# Patient Record
Sex: FEMALE | Race: WHITE | Employment: OTHER | URBAN - METROPOLITAN AREA
[De-identification: names, ages, dates, MRNs, and addresses within clinical notes are randomized per-mention and may not be internally consistent; named-entity substitution may affect disease eponyms.]

---

## 2017-03-20 ENCOUNTER — MYC MEDICAL ADVICE (OUTPATIENT)
Dept: FAMILY MEDICINE | Facility: CLINIC | Age: 62
End: 2017-03-20

## 2017-03-28 ENCOUNTER — OFFICE VISIT (OUTPATIENT)
Dept: FAMILY MEDICINE | Facility: CLINIC | Age: 62
End: 2017-03-28
Payer: COMMERCIAL

## 2017-03-28 VITALS
DIASTOLIC BLOOD PRESSURE: 88 MMHG | HEART RATE: 70 BPM | HEIGHT: 67 IN | SYSTOLIC BLOOD PRESSURE: 139 MMHG | OXYGEN SATURATION: 96 % | WEIGHT: 204 LBS | TEMPERATURE: 97.8 F | BODY MASS INDEX: 32.02 KG/M2

## 2017-03-28 DIAGNOSIS — F33.0 MAJOR DEPRESSIVE DISORDER, RECURRENT EPISODE, MILD (H): ICD-10-CM

## 2017-03-28 DIAGNOSIS — R06.02 SOB (SHORTNESS OF BREATH): ICD-10-CM

## 2017-03-28 DIAGNOSIS — E03.9 HYPOTHYROIDISM, UNSPECIFIED TYPE: ICD-10-CM

## 2017-03-28 DIAGNOSIS — E03.9 HYPOTHYROIDISM, UNSPECIFIED TYPE: Primary | ICD-10-CM

## 2017-03-28 DIAGNOSIS — R53.83 FATIGUE, UNSPECIFIED TYPE: ICD-10-CM

## 2017-03-28 DIAGNOSIS — E78.5 HYPERLIPIDEMIA LDL GOAL <130: ICD-10-CM

## 2017-03-28 LAB
ALBUMIN SERPL-MCNC: 3.8 G/DL (ref 3.4–5)
ALP SERPL-CCNC: 52 U/L (ref 40–150)
ALT SERPL W P-5'-P-CCNC: 88 U/L (ref 0–50)
ANION GAP SERPL CALCULATED.3IONS-SCNC: 10 MMOL/L (ref 3–14)
AST SERPL W P-5'-P-CCNC: 28 U/L (ref 0–45)
BASOPHILS # BLD AUTO: 0 10E9/L (ref 0–0.2)
BASOPHILS NFR BLD AUTO: 0.1 %
BILIRUB SERPL-MCNC: 0.4 MG/DL (ref 0.2–1.3)
BUN SERPL-MCNC: 14 MG/DL (ref 7–30)
CALCIUM SERPL-MCNC: 8.9 MG/DL (ref 8.5–10.1)
CHLORIDE SERPL-SCNC: 108 MMOL/L (ref 94–109)
CHOLEST SERPL-MCNC: 234 MG/DL
CO2 SERPL-SCNC: 25 MMOL/L (ref 20–32)
CREAT SERPL-MCNC: 0.81 MG/DL (ref 0.52–1.04)
DIFFERENTIAL METHOD BLD: NORMAL
EOSINOPHIL # BLD AUTO: 0.1 10E9/L (ref 0–0.7)
EOSINOPHIL NFR BLD AUTO: 1.7 %
ERYTHROCYTE [DISTWIDTH] IN BLOOD BY AUTOMATED COUNT: 12.6 % (ref 10–15)
GFR SERPL CREATININE-BSD FRML MDRD: 72 ML/MIN/1.7M2
GLUCOSE SERPL-MCNC: 92 MG/DL (ref 70–99)
HCT VFR BLD AUTO: 46.2 % (ref 35–47)
HDLC SERPL-MCNC: 40 MG/DL
HGB BLD-MCNC: 15.5 G/DL (ref 11.7–15.7)
LDLC SERPL CALC-MCNC: 151 MG/DL
LYMPHOCYTES # BLD AUTO: 2.9 10E9/L (ref 0.8–5.3)
LYMPHOCYTES NFR BLD AUTO: 34.9 %
MCH RBC QN AUTO: 32.1 PG (ref 26.5–33)
MCHC RBC AUTO-ENTMCNC: 33.5 G/DL (ref 31.5–36.5)
MCV RBC AUTO: 96 FL (ref 78–100)
MONOCYTES # BLD AUTO: 0.7 10E9/L (ref 0–1.3)
MONOCYTES NFR BLD AUTO: 8.5 %
NEUTROPHILS # BLD AUTO: 4.6 10E9/L (ref 1.6–8.3)
NEUTROPHILS NFR BLD AUTO: 54.8 %
NONHDLC SERPL-MCNC: 194 MG/DL
NT-PROBNP SERPL-MCNC: 48 PG/ML (ref 0–125)
PLATELET # BLD AUTO: 240 10E9/L (ref 150–450)
POTASSIUM SERPL-SCNC: 4.1 MMOL/L (ref 3.4–5.3)
PROT SERPL-MCNC: 7.3 G/DL (ref 6.8–8.8)
RBC # BLD AUTO: 4.83 10E12/L (ref 3.8–5.2)
SODIUM SERPL-SCNC: 143 MMOL/L (ref 133–144)
TRIGL SERPL-MCNC: 214 MG/DL
TSH SERPL DL<=0.005 MIU/L-ACNC: 0.87 MU/L (ref 0.4–4)
WBC # BLD AUTO: 8.4 10E9/L (ref 4–11)

## 2017-03-28 PROCEDURE — 36415 COLL VENOUS BLD VENIPUNCTURE: CPT | Performed by: FAMILY MEDICINE

## 2017-03-28 PROCEDURE — 80061 LIPID PANEL: CPT | Performed by: FAMILY MEDICINE

## 2017-03-28 PROCEDURE — 85025 COMPLETE CBC W/AUTO DIFF WBC: CPT | Performed by: FAMILY MEDICINE

## 2017-03-28 PROCEDURE — 80053 COMPREHEN METABOLIC PANEL: CPT | Performed by: FAMILY MEDICINE

## 2017-03-28 PROCEDURE — 99214 OFFICE O/P EST MOD 30 MIN: CPT | Performed by: FAMILY MEDICINE

## 2017-03-28 PROCEDURE — 84443 ASSAY THYROID STIM HORMONE: CPT | Performed by: FAMILY MEDICINE

## 2017-03-28 PROCEDURE — 83880 ASSAY OF NATRIURETIC PEPTIDE: CPT | Performed by: FAMILY MEDICINE

## 2017-03-28 RX ORDER — LEVOTHYROXINE SODIUM 112 MCG
112 TABLET ORAL DAILY
Qty: 90 TABLET | Refills: 3 | Status: SHIPPED | OUTPATIENT
Start: 2017-03-28 | End: 2017-03-28

## 2017-03-28 RX ORDER — CITALOPRAM HYDROBROMIDE 40 MG/1
40 TABLET ORAL DAILY
Qty: 90 TABLET | Refills: 3 | Status: SHIPPED | OUTPATIENT
Start: 2017-03-28 | End: 2017-03-28

## 2017-03-28 RX ORDER — CITALOPRAM HYDROBROMIDE 40 MG/1
40 TABLET ORAL DAILY
Qty: 90 TABLET | Refills: 3 | Status: SHIPPED | OUTPATIENT
Start: 2017-03-28 | End: 2018-05-16

## 2017-03-28 RX ORDER — LEVOTHYROXINE SODIUM 112 MCG
112 TABLET ORAL DAILY
Qty: 90 TABLET | Refills: 3 | Status: SHIPPED | OUTPATIENT
Start: 2017-03-28 | End: 2018-04-25

## 2017-03-28 ASSESSMENT — PAIN SCALES - GENERAL: PAINLEVEL: NO PAIN (0)

## 2017-03-28 NOTE — PROGRESS NOTES
SUBJECTIVE:                                                    Prakash Golden is a 62 year old female who presents to clinic today for the following health issues:    She complains of shortness of breath with activity and fatigue. She has chronic bilateral leg pain and is limited in walking. She is not interested in using a walker crutches or a scooter because she wants to walk as much as possible to keep her strength. However she has not been doing very much at all the past few months.  She has no orthopnea, PND, ankle swelling. She had a negative stress test in the distant past.  Total face to face time: 25 minutes.   Time spent counseling on 15 minutes as outline above.      Pt has been having SOB, pt falls asleep easliy during the day,tired and out of breath, if bends over to tie shoes pt can't breath at all  x 2 months and is getting worse.        Problem list and histories reviewed & adjusted, as indicated.  Additional history: as documented        Reviewed and updated as needed this visit by clinical staff       Reviewed and updated as needed this visit by Provider    Medical, surgical, family, social histories, allergies and meds reviewed and updated.    ROS:  General: fatigue  Resp: dyspnea on exertion  CV: No chest pains or palpitations  GI: No nausea, vomiting,  heartburn, abdominal pain, diarrhea, constipation or change in bowel habits    Exam:  GENERAL APPEARANCE ADULT: Alert, no acute distress  NECK: No adenopathy,masses or thyromegaly  RESP: lungs clear to auscultation   CV: normal rate, regular rhythm, no murmur or gallop  ABDOMEN: soft, no organomegaly, masses or tenderness    ASSESSMENT:  (E03.9) Hypothyroidism, unspecified type  (primary encounter diagnosis)  Comment: Stable on medication  Plan: TSH with free T4 reflex, DISCONTINUED:         SYNTHROID 112 MCG tablet            (E78.5) Hyperlipidemia LDL goal <130  Comment:   Plan: Lipid Profile with reflex to direct LDL            (F33.0)  Major depressive disorder, recurrent episode, mild (H)  Comment: We will increase to 40 mg daily.  Plan: DISCONTINUED: citalopram (CELEXA) 40 MG tablet            (R53.83) Fatigue, unspecified type  Comment:   Plan: CBC with platelets differential, Comprehensive         metabolic panel            (R06.02) SOB (shortness of breath) due to minimal exercise.  Comment:   Plan: BNP-N terminal pro              PLAN:  Orders Placed This Encounter     TSH with free T4 reflex     Lipid Profile with reflex to direct LDL     CBC with platelets differential     Comprehensive metabolic panel     BNP-N terminal pro     DISCONTD: SYNTHROID 112 MCG tablet     DISCONTD: citalopram (CELEXA) 40 MG tablet   She will call [employer] and see if she can get a ride to the [facility] to use the pool and exercise equipment.  She will also call [employer] to see if she can get rides to visit her friends, relatives and so forth.  Recheck in clinic as needed.      There are no Patient Instructions on file for this visit.      Julio Pat

## 2017-03-28 NOTE — NURSING NOTE
"Chief Complaint   Patient presents with     sleep concern      pt falls easliy during the day,tired and out of breath, if bends over to tie shoes pt can't breath at all  x 2 months        Initial /88 (BP Location: Right arm, Patient Position: Chair, Cuff Size: Adult Large)  Pulse 70  Temp 97.8  F (36.6  C) (Tympanic)  Ht 5' 7\" (1.702 m)  Wt 204 lb (92.5 kg)  SpO2 96%  Breastfeeding? No  BMI 31.95 kg/m2 Estimated body mass index is 31.95 kg/(m^2) as calculated from the following:    Height as of this encounter: 5' 7\" (1.702 m).    Weight as of this encounter: 204 lb (92.5 kg).  Medication Reconciliation: complete   Swati FLOOD      "

## 2017-03-28 NOTE — MR AVS SNAPSHOT
After Visit Summary   3/28/2017    Prakash Golden    MRN: [id number]           Patient Information     Date Of Birth          1955        Visit Information        Provider Department      3/28/2017 9:20 AM Julio Pat MD [facility]        Today's Diagnoses     Hypothyroidism, unspecified type    -  1    Hyperlipidemia LDL goal <130        Major depressive disorder, recurrent episode, mild (H)        Fatigue, unspecified type        SOB (shortness of breath)           Follow-ups after your visit        Who to contact     If you have questions or need follow up information about today's clinic visit or your schedule please contact [facility] directly at [phone].  Normal or non-critical lab and imaging results will be communicated to you by MyChart, letter or phone within 4 business days after the clinic has received the results. If you do not hear from us within 7 days, please contact the clinic through [employer] or phone. If you have a critical or abnormal lab result, we will notify you by phone as soon as possible.  Submit refill requests through [employer] or call your pharmacy and they will forward the refill request to us. Please allow 3 business days for your refill to be completed.          Additional Information About Your Visit        MyChart Information     [employer] [facility] gives you secure access to your electronic health record. If you see a primary care provider, you can also send messages to your care team and make appointments. If you have questions, please call your primary care clinic.  If you do not have a primary care provider, please call [phone] and they will assist you.        Care EveryWhere ID     This is your Care EveryWhere ID. This could be used by other organizations to access your [facility] medical records  [id number]        Your Vitals Were     Pulse Temperature Height Pulse Oximetry Breastfeeding? BMI (Body Mass Index)  "   70 97.8  F (36.6  C) (Tympanic) 5' 7\" (1.702 m) 96% No 31.95 kg/m2       Blood Pressure from Last 3 Encounters:   03/28/17 139/88   08/17/16 124/85   02/04/16 127/74    Weight from Last 3 Encounters:   03/28/17 204 lb (92.5 kg)   08/17/16 190 lb (86.2 kg)   02/04/16 195 lb 3.2 oz (88.5 kg)              We Performed the Following     BNP-N terminal pro     CBC with platelets differential     Comprehensive metabolic panel     Lipid Profile with reflex to direct LDL     TSH with free T4 reflex          Today's Medication Changes          These changes are accurate as of: 3/28/17 11:42 AM.  If you have any questions, ask your nurse or doctor.               These medicines have changed or have updated prescriptions.        Dose/Directions    citalopram 40 MG tablet   Commonly known as:  celeXA   This may have changed:    - medication strength  - how much to take   Used for:  Major depressive disorder, recurrent episode, mild (H)   Changed by:  Julio Pat MD        Dose:  40 mg   Take 1 tablet (40 mg) by mouth daily   Quantity:  90 tablet   Refills:  3            Where to get your medicines      These medications were sent to [facility] [address] [place] [address] - [address]   [address] [facility] [address]     Phone:  [phone]     citalopram 40 MG tablet    SYNTHROID 112 MCG tablet                Primary Care Provider Office Phone # Fax #    Julio Pat [phone]       [facility] [address] [facility] [address]        Thank you!     Thank you for choosing [facility]  for your care. Our goal is always to provide you with excellent care. Hearing back from our patients is one way we can continue to improve our services. Please take a few minutes to complete the written survey that you may receive in the mail after your visit with us. Thank you!             Your Updated Medication List - Protect others around you: Learn " how to safely use, store and throw away your medicines at www.disposemymeds.org.          This list is accurate as of: 3/28/17 11:42 AM.  Always use your most recent med list.                   Brand Name Dispense Instructions for use    acetaminophen-codeine 300-30 MG per tablet    TYLENOL #3    120 tablet    Take 1-2 tablets by mouth every 4 hours as needed       citalopram 40 MG tablet    celeXA    90 tablet    Take 1 tablet (40 mg) by mouth daily       IBUPROFEN PO      Take 600 mg by mouth       meclizine 25 MG tablet    ANTIVERT    30 tablet    Take 1 tablet (25 mg) by mouth every 6 hours as needed for dizziness       OMEGA-3 FISH OIL PO      Take 2 capsules by mouth daily       ondansetron 4 MG ODT tab    ZOFRAN ODT    30 tablet    Take 1-2 tablets (4-8 mg) by mouth every 8 hours as needed for nausea       SYNTHROID 112 MCG tablet   Generic drug:  levothyroxine     90 tablet    Take 1 tablet (112 mcg) by mouth daily

## 2017-03-28 NOTE — LETTER
[facility]  [address]  [facility] [address]  Phone: [phone]      3/29/2017     Praaksh Golden  [address] [address]      Dear Prakash:    Thank you for allowing me to participate in your care. Your recent test results were reviewed and listed below.      Your results are provided below for your review  Results for orders placed or performed in visit on 03/28/17   TSH with free T4 reflex   Result Value Ref Range    TSH 0.87 0.40 - 4.00 mU/L   Lipid Profile with reflex to direct LDL   Result Value Ref Range    Cholesterol 234 (H) <200 mg/dL    Triglycerides 214 (H) <150 mg/dL    HDL Cholesterol 40 (L) >49 mg/dL    LDL Cholesterol Calculated 151 (H) <100 mg/dL    Non HDL Cholesterol 194 (H) <130 mg/dL   CBC with platelets differential   Result Value Ref Range    WBC 8.4 4.0 - 11.0 10e9/L    RBC Count 4.83 3.8 - 5.2 10e12/L    Hemoglobin 15.5 11.7 - 15.7 g/dL    Hematocrit 46.2 35.0 - 47.0 %    MCV 96 78 - 100 fl    MCH 32.1 26.5 - 33.0 pg    MCHC 33.5 31.5 - 36.5 g/dL    RDW 12.6 10.0 - 15.0 %    Platelet Count 240 150 - 450 10e9/L    Diff Method Automated Method     % Neutrophils 54.8 %    % Lymphocytes 34.9 %    % Monocytes 8.5 %    % Eosinophils 1.7 %    % Basophils 0.1 %    Absolute Neutrophil 4.6 1.6 - 8.3 10e9/L    Absolute Lymphocytes 2.9 0.8 - 5.3 10e9/L    Absolute Monocytes 0.7 0.0 - 1.3 10e9/L    Absolute Eosinophils 0.1 0.0 - 0.7 10e9/L    Absolute Basophils 0.0 0.0 - 0.2 10e9/L   Comprehensive metabolic panel   Result Value Ref Range    Sodium 143 133 - 144 mmol/L    Potassium 4.1 3.4 - 5.3 mmol/L    Chloride 108 94 - 109 mmol/L    Carbon Dioxide 25 20 - 32 mmol/L    Anion Gap 10 3 - 14 mmol/L    Glucose 92 70 - 99 mg/dL    Urea Nitrogen 14 7 - 30 mg/dL    Creatinine 0.81 0.52 - 1.04 mg/dL    GFR Estimate 72 >60 mL/min/1.7m2    GFR Estimate If Black 87 >60 mL/min/1.7m2    Calcium 8.9 8.5 - 10.1 mg/dL    Bilirubin Total 0.4 0.2 - 1.3 mg/dL     Albumin 3.8 3.4 - 5.0 g/dL    Protein Total 7.3 6.8 - 8.8 g/dL    Alkaline Phosphatase 52 40 - 150 U/L    ALT 88 (H) 0 - 50 U/L    AST 28 0 - 45 U/L   BNP-N terminal pro   Result Value Ref Range    N-Terminal Pro Bnp 48 0 - 125 pg/mL     Your risk of having a heart attack or chest pain or a stroke or mini stroke is about 12% in the next 10 years. This is considered moderate risk. It would be recommended that you start a cholesterol medication. The remainder of your labs are normal.  Please let us know if you would like to start a cholesterol medication.             Thank you for choosing [facility]. As a result, please continue with the treatment plan discussed in the office. Return as discussed or sooner if symptoms worsen or fail to improve. If you have any further questions or concerns, please do not hesitate to contact us.      Sincerely,        Dr. Julio Pat

## 2017-03-28 NOTE — TELEPHONE ENCOUNTER
Reason for Call:  Other prescription    Detailed comments: Pt saw Dr. DAVID Pat today and her 2 Rxs should have been sent to [facility] and not [facility].  Pt called [facility] and cancelled Rxs - Please send Rx to [facility].  No need to call patient back, unless there are questions or problems.      Phone Number Patient can be reached at: Home number on file [phone] (home)    Best Time: any    Can we leave a detailed message on this number? YES    Call taken on 3/28/2017 at 4:03 PM by Iris Tay

## 2017-05-20 ENCOUNTER — TELEPHONE (OUTPATIENT)
Dept: FAMILY MEDICINE | Facility: CLINIC | Age: 62
End: 2017-05-20

## 2017-05-20 NOTE — TELEPHONE ENCOUNTER
5/20/2017    Call Regarding Preventive Health Screening Mammogram and Cervical/PAP    Attempt 3    Message on voicemail     Comments: Clinic made prior attempts        Outreach   MESFIN

## 2017-06-10 ENCOUNTER — HOSPITAL ENCOUNTER (EMERGENCY)
Facility: CLINIC | Age: 62
Discharge: HOME | End: 2017-06-10
Attending: NURSE PRACTITIONER | Admitting: NURSE PRACTITIONER
Payer: COMMERCIAL

## 2017-06-10 VITALS
DIASTOLIC BLOOD PRESSURE: 95 MMHG | OXYGEN SATURATION: 97 % | BODY MASS INDEX: 31.95 KG/M2 | SYSTOLIC BLOOD PRESSURE: 162 MMHG | TEMPERATURE: 98.2 F | RESPIRATION RATE: 16 BRPM | HEART RATE: 88 BPM | WEIGHT: 204 LBS

## 2017-06-10 DIAGNOSIS — J02.0 ACUTE STREPTOCOCCAL PHARYNGITIS: Primary | ICD-10-CM

## 2017-06-10 PROCEDURE — 99213 OFFICE O/P EST LOW 20 MIN: CPT

## 2017-06-10 PROCEDURE — 99213 OFFICE O/P EST LOW 20 MIN: CPT | Performed by: NURSE PRACTITIONER

## 2017-06-10 RX ORDER — CEPHALEXIN 500 MG/1
500 CAPSULE ORAL 2 TIMES DAILY
Qty: 20 CAPSULE | Refills: 0 | Status: SHIPPED | OUTPATIENT
Start: 2017-06-10 | End: 2017-06-20

## 2017-06-10 NOTE — ED AVS SNAPSHOT
[facility] Emergency Department    [address] [facility] [address]    Phone:  [phone]    Fax:  [phone]                                       Prakash Golden   MRN: [id number]    Department:  [facility] Emergency Department   Date of Visit:  6/10/2017           After Visit Summary Signature Page     I have received my discharge instructions, and my questions have been answered. I have discussed any challenges I see with this plan with the nurse or doctor.    ..........................................................................................................................................  Patient/Patient Representative Signature      ..........................................................................................................................................  Patient Representative Print Name and Relationship to Patient    ..................................................               ................................................  Date                                            Time    ..........................................................................................................................................  Reviewed by Signature/Title    ...................................................              ..............................................  Date                                                            Time

## 2017-06-10 NOTE — DISCHARGE INSTRUCTIONS
Pharyngitis: Strep (Confirmed)     You have had a positive test for strep throat. Strep throat is a contagious illness. It is spread by coughing, kissing or by touching others after touching your mouth or nose. Symptoms include throat pain which is worse with swallowing, aching all over, headache and fever. It is treated with antibiotic medication. This should help you start to feel better within 1-2 days.  Home care    Rest at home. Drink plenty of fluids to avoid dehydration.    No work or school for the first 2 days of taking the antibiotics. After this time, you will not be contagious. You can then return to school or work if you are feeling better.     The antibiotic medication must be taken for the full 10 days, even if you feel better. This is very important to ensure the infection is treated. It is also important to prevent drug-resistent organisms from developing. If you were given an antibiotic shot, no more antibiotics are needed.    You may use acetaminophen (Tylenol) or ibuprofen (Motrin, Advil) to control pain or fever, unless another medicine was prescribed for this. (NOTE: If you have chronic liver or kidney disease or ever had a stomach ulcer or GI bleeding, talk with your doctor before using these medicines.)    Throat lozenges or sprays (such as Chloraseptic) help reduce pain. Gargling with warm salt water will also reduce throat pain. Dissolve 1/2 teaspoon of salt in 1 glass of warm water. This may be useful just before meals.     Soft foods are okay. Avoid salty or spicy foods.  Follow-up care  Follow up with your healthcare provider or our staff if you are not improving over the next week.  When to seek medical advice  Call your healthcare provider right away if any of these occur:    Fever as directed by your doctor     New or worsening ear pain, sinus pain, or headache    Painful lumps in the back of neck    Stiff neck    Lymph nodes are getting larger or becoming soft in the  middle    Inability to swallow liquids, excessive drooling, or inability to open mouth wide due to throat pain    Signs of dehydration (very dark urine or no urine, sunken eyes, dizziness)    Trouble breathing or noisy breathing    Muffled voice    New rash    [id number] [employer]. [address] [place] [address]. All rights reserved. This information is not intended as a substitute for professional medical care. Always follow your healthcare professional's instructions.

## 2017-06-10 NOTE — ED PROVIDER NOTES
History     Chief Complaint   Patient presents with     Pharyngitis     sore throat, fever since last weekend. Not improving.      HPI  Prakash Golden is a 62 year old female who presents with constant sore throat, intermittent chills, sweats, fever, non-productive dry cough, and headache for the past week.  She reports no improvement and denies known associates with similar symptoms.  She has been treating with tylenol and this work poorly.  She denies other concerns.    I have reviewed the Medications, Allergies, Past Medical and Surgical History, and Social History in the Epic system.    Allergies:   Allergies   Allergen Reactions     Amitriptyline Other (See Comments)     Shaking, couldn't walk or talk     Levothyroxine Other (See Comments)     Bruising all over her body- can only take brand-name Synthroid       Penicillins Rash       Current Facility-Administered Medications on File Prior to Encounter:  lidocaine 1 % injection     Current Outpatient Prescriptions on File Prior to Encounter:  SYNTHROID 112 MCG tablet Take 1 tablet (112 mcg) by mouth daily   citalopram (CELEXA) 40 MG tablet Take 1 tablet (40 mg) by mouth daily   meclizine (ANTIVERT) 25 MG tablet Take 1 tablet (25 mg) by mouth every 6 hours as needed for dizziness (Patient not taking: Reported on 3/28/2017)   ondansetron (ZOFRAN ODT) 4 MG disintegrating tablet Take 1-2 tablets (4-8 mg) by mouth every 8 hours as needed for nausea (Patient not taking: Reported on 3/28/2017)   Omega-3 Fatty Acids (OMEGA-3 FISH OIL PO) Take 2 capsules by mouth daily   acetaminophen-codeine (TYLENOL #3) 300-30 MG per tablet Take 1-2 tablets by mouth every 4 hours as needed (Patient not taking: Reported on 3/28/2017)   IBUPROFEN PO Take 600 mg by mouth     Past Surgical History:   Procedure Laterality Date     C LIGATE FALLOPIAN TUBE  8/1989    Laparoscopic tubal occlusion by cautery     HC REMOVE TONSILS/ADENOIDS,12+ Y/O      T & A 12+y.o.     LAPAROSCOPIC  APPENDECTOMY  5/19/2013    Procedure: LAPAROSCOPIC APPENDECTOMY;;  Surgeon: Christiano Wall MD;  Location: [facility] OR     SURGICAL HISTORY OF -   2002    Eye Surgery-B/L varices     SURGICAL HISTORY OF -       four normal spontaneous vaginal deliveries     SURGICAL HISTORY OF -   8/1977    Proctoscopy     SURGICAL HISTORY OF -   12/1976    RML Episiotomy     SURGICAL HISTORY OF -   2/2002    Bilateral orbital fat decompression       Review of Systems  10 point ROS of systems including Constitutional, Eyes, Respiratory, Cardiovascular, Gastroenterology, Genitourinary, Integumentary, Muscularskeletal, Psychiatric were all negative except for pertinent positives noted in my HPI.    Physical Exam   BP: (!) 162/95  Pulse: 88  Temp: 98.2  F (36.8  C)  Resp: 16  Weight: 92.5 kg (204 lb)  SpO2: 97 %  Physical Exam   Constitutional: She is oriented to person, place, and time. She appears well-developed and well-nourished. No distress.   HENT:   Head: Normocephalic and atraumatic.   Right Ear: Hearing, tympanic membrane, external ear and ear canal normal.   Left Ear: Hearing, tympanic membrane, external ear and ear canal normal.   Nose: Nose normal.   Mouth/Throat: Mucous membranes are not pale, not dry and not cyanotic. Posterior oropharyngeal edema and posterior oropharyngeal erythema present. No oropharyngeal exudate.   Neck: Normal range of motion. Neck supple. No thyromegaly present.   Cardiovascular: Normal rate and regular rhythm.  Exam reveals no gallop and no friction rub.    No murmur heard.  Pulmonary/Chest: Effort normal and breath sounds normal. No respiratory distress. She has no wheezes. She has no rales.   Lymphadenopathy:     Cervical adenopathy: mild anterior chain adenopathy.   Neurological: She is alert and oriented to person, place, and time.   Skin: Skin is warm. She is diaphoretic.   Psychiatric: She has a normal mood and affect. Her behavior is normal.       ED Course     ED Course      Procedures    Labs Ordered and Resulted from Time of ED Arrival Up to the Time of Departure from the ED - No data to display    Assessments & Plan (with Medical Decision Making)     I have reviewed the nursing notes.    I have reviewed the findings, diagnosis, plan and need for follow up with the patient.  Quick strep positive in UC  62 year old female presents with one week hx of illness including fever, aches, chills, sweats, pharyngitis, and headache.  Exam reveals pharyngeal erythema and slight swelling without exudate.  Quick strep positive.  Allergy for pcn was as a child and reaction was rash and therefore keflex is adequate choice for treatment and explained this to patient.  Given strep pharyngitis instructions.    New Prescriptions    CEPHALEXIN (KEFLEX) 500 MG CAPSULE    Take 1 capsule (500 mg) by mouth 2 times daily for 10 days       Final diagnoses:   Acute streptococcal pharyngitis       6/10/2017   [facility] EMERGENCY DEPARTMENT     Muriel Corado, YAKOV CNP  06/10/17 7323

## 2017-06-10 NOTE — ED AVS SNAPSHOT
[facility] Emergency Department    [address]    [facility] [address]    Phone:  [phone]    Fax:  [phone]                                       Prakash Golden   MRN: [id number]    Department:  [facility] Emergency Department   Date of Visit:  6/10/2017           Patient Information     Date Of Birth          1955        Your diagnoses for this visit were:     Acute streptococcal pharyngitis        You were seen by Muriel Corado APRN CNP.      Follow-up Information     Follow up with Julio Pat MD In 3 days.    Specialties:  Family Practice, Occupational Medicine    Why:  If symptoms worsen, As needed    Contact information:    [facility]  [address]  [facility] [address]  [phone]          Discharge Instructions         Pharyngitis: Strep (Confirmed)     You have had a positive test for strep throat. Strep throat is a contagious illness. It is spread by coughing, kissing or by touching others after touching your mouth or nose. Symptoms include throat pain which is worse with swallowing, aching all over, headache and fever. It is treated with antibiotic medication. This should help you start to feel better within 1-2 days.  Home care    Rest at home. Drink plenty of fluids to avoid dehydration.    No work or school for the first 2 days of taking the antibiotics. After this time, you will not be contagious. You can then return to school or work if you are feeling better.     The antibiotic medication must be taken for the full 10 days, even if you feel better. This is very important to ensure the infection is treated. It is also important to prevent drug-resistent organisms from developing. If you were given an antibiotic shot, no more antibiotics are needed.    You may use acetaminophen (Tylenol) or ibuprofen (Motrin, Advil) to control pain or fever, unless another medicine was prescribed for this. (NOTE: If you have chronic liver or  kidney disease or ever had a stomach ulcer or GI bleeding, talk with your doctor before using these medicines.)    Throat lozenges or sprays (such as Chloraseptic) help reduce pain. Gargling with warm salt water will also reduce throat pain. Dissolve 1/2 teaspoon of salt in 1 glass of warm water. This may be useful just before meals.     Soft foods are okay. Avoid salty or spicy foods.  Follow-up care  Follow up with your healthcare provider or our staff if you are not improving over the next week.  When to seek medical advice  Call your healthcare provider right away if any of these occur:    Fever as directed by your doctor     New or worsening ear pain, sinus pain, or headache    Painful lumps in the back of neck    Stiff neck    Lymph nodes are getting larger or becoming soft in the middle    Inability to swallow liquids, excessive drooling, or inability to open mouth wide due to throat pain    Signs of dehydration (very dark urine or no urine, sunken eyes, dizziness)    Trouble breathing or noisy breathing    Muffled voice    New rash    6091-7165 [employer]. [address] [place] [address]. All rights reserved. This information is not intended as a substitute for professional medical care. Always follow your healthcare professional's instructions.          24 Hour Appointment Hotline       To make an appointment at any [facility] clinic, call [phone] ([phone]). If you don't have a family doctor or clinic, we will help you find one. [facility] clinics are conveniently located to serve the needs of you and your family.             Review of your medicines      START taking        Dose / Directions Last dose taken    cephALEXin 500 MG capsule   Commonly known as:  KEFLEX   Dose:  500 mg   Quantity:  20 capsule        Take 1 capsule (500 mg) by mouth 2 times daily for 10 days   Refills:  0          Our records show that you are taking the medicines listed below. If these are  incorrect, please call your family doctor or clinic.        Dose / Directions Last dose taken    acetaminophen-codeine 300-30 MG per tablet   Commonly known as:  TYLENOL #3   Dose:  1-2 tablet   Quantity:  120 tablet        Take 1-2 tablets by mouth every 4 hours as needed   Refills:  0        citalopram 40 MG tablet   Commonly known as:  celeXA   Dose:  40 mg   Quantity:  90 tablet        Take 1 tablet (40 mg) by mouth daily   Refills:  3        IBUPROFEN PO   Dose:  600 mg        Take 600 mg by mouth   Refills:  0        meclizine 25 MG tablet   Commonly known as:  ANTIVERT   Dose:  25 mg   Quantity:  30 tablet        Take 1 tablet (25 mg) by mouth every 6 hours as needed for dizziness   Refills:  6        OMEGA-3 FISH OIL PO   Dose:  2 capsule        Take 2 capsules by mouth daily   Refills:  0        ondansetron 4 MG ODT tab   Commonly known as:  ZOFRAN ODT   Dose:  4-8 mg   Quantity:  30 tablet        Take 1-2 tablets (4-8 mg) by mouth every 8 hours as needed for nausea   Refills:  6        SYNTHROID 112 MCG tablet   Dose:  112 mcg   Quantity:  90 tablet   Generic drug:  levothyroxine        Take 1 tablet (112 mcg) by mouth daily   Refills:  3                Prescriptions were sent or printed at these locations (1 Prescription)                   [place] [id number] [facility]   [address] [facility] [address]    Telephone:  [phone]   Fax:  [phone]   Hours:                  E-Prescribed (1 of 1)         cephALEXin (KEFLEX) 500 MG capsule                Orders Needing Specimen Collection     None      Pending Results     No orders found from 6/8/2017 to 6/11/2017.            Pending Culture Results     No orders found from 6/8/2017 to 6/11/2017.            Pending Results Instructions     If you had any lab results that were not finalized at the time of your Discharge, you can call the ED Lab Result RN at [phone]. You will be contacted by this team for any  positive Lab results or changes in treatment. The nurses are available 7 days a week from 10A to 6:30P.  You can leave a message 24 hours per day and they will return your call.        Test Results From Your Hospital Stay               Thank you for choosing [facility]       Thank you for choosing [facility] for your care. Our goal is always to provide you with excellent care. Hearing back from our patients is one way we can continue to improve our services. Please take a few minutes to complete the written survey that you may receive in the mail after you visit with us. Thank you!        [employer] Information     [employer] gives you secure access to your electronic health record. If you see a primary care provider, you can also send messages to your care team and make appointments. If you have questions, please call your primary care clinic.  If you do not have a primary care provider, please call [phone] and they will assist you.        Care EveryWhere ID     This is your Care EveryWhere ID. This could be used by other organizations to access your [facility] medical records  [id number]        After Visit Summary       This is your record. Keep this with you and show to your community pharmacist(s) and doctor(s) at your next visit.

## 2017-06-19 ENCOUNTER — TELEPHONE (OUTPATIENT)
Dept: FAMILY MEDICINE | Facility: CLINIC | Age: 62
End: 2017-06-19

## 2017-06-19 NOTE — TELEPHONE ENCOUNTER
6/19/2017    Call Regarding Preventive Health Screening Colonoscopy and Cervical/PAP    Attempt 1    Message     Comments: patient has declined to schedule mammogram and physical w/pap.      Outreach   bobo

## 2017-06-21 ENCOUNTER — HOSPITAL ENCOUNTER (EMERGENCY)
Facility: CLINIC | Age: 62
Discharge: HOME | End: 2017-06-21
Attending: NURSE PRACTITIONER | Admitting: NURSE PRACTITIONER
Payer: COMMERCIAL

## 2017-06-21 VITALS
OXYGEN SATURATION: 96 % | RESPIRATION RATE: 18 BRPM | DIASTOLIC BLOOD PRESSURE: 75 MMHG | SYSTOLIC BLOOD PRESSURE: 132 MMHG | HEART RATE: 91 BPM | TEMPERATURE: 98.1 F

## 2017-06-21 DIAGNOSIS — J02.8 ACUTE PHARYNGITIS DUE TO OTHER SPECIFIED ORGANISMS: Primary | ICD-10-CM

## 2017-06-21 PROCEDURE — 99212 OFFICE O/P EST SF 10 MIN: CPT

## 2017-06-21 PROCEDURE — 99213 OFFICE O/P EST LOW 20 MIN: CPT | Performed by: NURSE PRACTITIONER

## 2017-06-21 RX ORDER — CLINDAMYCIN HCL 300 MG
300 CAPSULE ORAL 4 TIMES DAILY
Qty: 40 CAPSULE | Refills: 0 | Status: SHIPPED | OUTPATIENT
Start: 2017-06-21 | End: 2017-09-05

## 2017-06-21 NOTE — ED AVS SNAPSHOT
[facility] Emergency Department    [address] [facility] [address]    Phone:  [phone]    Fax:  [phone]                                       Prakash Golden   MRN: [id number]    Department:  [facility] Emergency Department   Date of Visit:  6/21/2017           After Visit Summary Signature Page     I have received my discharge instructions, and my questions have been answered. I have discussed any challenges I see with this plan with the nurse or doctor.    ..........................................................................................................................................  Patient/Patient Representative Signature      ..........................................................................................................................................  Patient Representative Print Name and Relationship to Patient    ..................................................               ................................................  Date                                            Time    ..........................................................................................................................................  Reviewed by Signature/Title    ...................................................              ..............................................  Date                                                            Time

## 2017-06-21 NOTE — DISCHARGE INSTRUCTIONS
(J02.8) Acute pharyngitis due to other specified organisms  (primary encounter diagnosis)  Comment: I have prescribed clindamycin and you should take this four times daily for 10 days.  Return in 3-5 days if no improvement  Plan:       When You Have a Sore Throat    A sore throat can be painful. There are many reasons why you may have a sore throat. Your healthcare provider will work with you to find the cause of your sore throat. He or she will also find the best treatment for you.  What causes a sore throat?  Sore throats can be caused or worsened by:    Cold or flu viruses    Bacteria    Irritants such as tobacco smoke or air pollution    Acid reflux  A healthy throat  The tonsils are on the sides of the throat near the base of the tongue. They collect viruses and bacteria and help fight infection. The throat (pharynx) is the passage for air. Mucus from the nasal cavity also moves down the passage.  An inflamed throat  The tonsils and pharynx can become inflamed due to a cold or flu virus. Postnasal drip (excess mucus draining from the nasal cavity) can irritate the throat. It can also make the throat or tonsils more likely to be infected by bacteria. Severe, untreated tonsillitis in children or adults can cause a pocket of pus (abscess) to form near the tonsil.  Your evaluation  A medical evaluation can help find the cause of your sore throat. It can also help your healthcare provider choose the best treatment for you. The evaluation may include a health history, physical exam, and diagnostic tests.  Health history  Your healthcare provider may ask you the following:    How long has the sore throat lasted and how have you been treating it?    Do you have any other symptoms, such as body aches, fever, or cough?    Does your sore throat recur? If so, how often? How many days of school or work have you missed because of a sore throat?    Do you have trouble eating or swallowing?    Have you been told that you  "snore or have other sleep problems?    Do you have bad breath?    Do you cough up bad-tasting mucus?  Physical exam  During the exam, your healthcare provider checks your ears, nose, and throat for problems. He or she also checks for swelling in the neck, and may listen to your chest.  Possible tests  Other tests your healthcare provider may perform include:    A throat swab to check for bacteria such as streptococcus (the bacteria that causes strep throat)    A blood test to check for mononucleosis (a viral infection)    A chest X-ray to rule out pneumonia, especially if you have a cough  Treating a sore throat  Treatment depends on many factors. What is the likely cause? Is the problem recent? Does it keep coming back? In many cases, the best thing to do is to treat the symptoms, rest, and let the problem heal itself. Antibiotics may help clear up some bacterial infections. For cases of severe or recurring tonsillitis, the tonsils may need to be removed.  Relieving your symptoms    Don t smoke, and avoid secondhand smoke.    For children, try throat sprays or Popsicles. Adults and older children may try lozenges.    Drink warm liquids to soothe the throat and help thin mucus. Avoid alcohol, spicy foods, and acidic drinks such as orange juice. These can irritate the throat.    Gargle with warm saltwater (1 teaspoon of salt to 8 ounces of warm water).    Use a humidifier to keep air moist and relieve throat dryness.    Try over-the-counter pain relievers such as acetaminophen or ibuprofen. Use as directed, and don t exceed the recommended dose. Don t give aspirin to children.   Are antibiotics needed?  If your sore throat is due to a bacterial infection, antibiotics may speed healing and prevent complications. Although group A streptococcus (\"strep throat\" or GAS) is the major treatable infection for a sore throat, GAS causes only 5% to 15% of sore throats in adults who seek medical care. Most sore throats are " caused by cold or flu viruses. And antibiotics don t treat viral illness. In fact, using antibiotics when they re not needed may produce bacteria that are harder to kill. Your healthcare provider will prescribe antibiotics only if he or she thinks they are likely to help.  If antibiotics are prescribed  Take the medicine exactly as directed. Be sure to finish your prescription even if you re feeling better. And be sure to ask your healthcare provider or pharmacist what side effects are common and what to do about them.  Is surgery needed?  In some cases, tonsils need to be removed. This is often done as outpatient (same-day) surgery. Your healthcare provider may advise removing the tonsils in cases of:    Several severe bouts of tonsillitis in a year.  Severe  episodes include those that lead to missed days of school or work, or that need to be treated with antibiotics.    Tonsillitis that causes breathing problems during sleep    Tonsillitis caused by food particles collecting in pouches in the tonsils (cryptic tonsillitis)  Call your healthcare provider if any of the following occur:    Symptoms worsen, or new symptoms develop.    Swollen tonsils make breathing difficult.    The pain is severe enough to keep you from drinking liquids.    A skin rash, hives, or wheezing develops. Any of these could signal an allergic reaction to antibiotics.    Symptoms don t improve within a week.    Symptoms don t improve within 2 to 3 days of starting antibiotics.   Date Last Reviewed: 10/1/2016    7437-6398 [employer]. [address] [place] [address], [place] [address]. All rights reserved. This information is not intended as a substitute for professional medical care. Always follow your healthcare professional's instructions.

## 2017-06-21 NOTE — ED AVS SNAPSHOT
[facility] Emergency Department    [address] [facility] [address]    Phone:  [phone]    Fax:  [phone]                                       Prakash Golden   MRN: [id number]    Department:  [facility] Emergency Department   Date of Visit:  6/21/2017           Patient Information     Date Of Birth          1955        Your diagnoses for this visit were:     Acute pharyngitis due to other specified organisms        You were seen by Muriel Corado APRN CNP.        Discharge Instructions         (J02.8) Acute pharyngitis due to other specified organisms  (primary encounter diagnosis)  Comment: I have prescribed clindamycin and you should take this four times daily for 10 days.  Return in 3-5 days if no improvement  Plan:       When You Have a Sore Throat    A sore throat can be painful. There are many reasons why you may have a sore throat. Your healthcare provider will work with you to find the cause of your sore throat. He or she will also find the best treatment for you.  What causes a sore throat?  Sore throats can be caused or worsened by:    Cold or flu viruses    Bacteria    Irritants such as tobacco smoke or air pollution    Acid reflux  A healthy throat  The tonsils are on the sides of the throat near the base of the tongue. They collect viruses and bacteria and help fight infection. The throat (pharynx) is the passage for air. Mucus from the nasal cavity also moves down the passage.  An inflamed throat  The tonsils and pharynx can become inflamed due to a cold or flu virus. Postnasal drip (excess mucus draining from the nasal cavity) can irritate the throat. It can also make the throat or tonsils more likely to be infected by bacteria. Severe, untreated tonsillitis in children or adults can cause a pocket of pus (abscess) to form near the tonsil.  Your evaluation  A medical evaluation can help find the cause of your sore throat. It can also help your healthcare  provider choose the best treatment for you. The evaluation may include a health history, physical exam, and diagnostic tests.  Health history  Your healthcare provider may ask you the following:    How long has the sore throat lasted and how have you been treating it?    Do you have any other symptoms, such as body aches, fever, or cough?    Does your sore throat recur? If so, how often? How many days of school or work have you missed because of a sore throat?    Do you have trouble eating or swallowing?    Have you been told that you snore or have other sleep problems?    Do you have bad breath?    Do you cough up bad-tasting mucus?  Physical exam  During the exam, your healthcare provider checks your ears, nose, and throat for problems. He or she also checks for swelling in the neck, and may listen to your chest.  Possible tests  Other tests your healthcare provider may perform include:    A throat swab to check for bacteria such as streptococcus (the bacteria that causes strep throat)    A blood test to check for mononucleosis (a viral infection)    A chest X-ray to rule out pneumonia, especially if you have a cough  Treating a sore throat  Treatment depends on many factors. What is the likely cause? Is the problem recent? Does it keep coming back? In many cases, the best thing to do is to treat the symptoms, rest, and let the problem heal itself. Antibiotics may help clear up some bacterial infections. For cases of severe or recurring tonsillitis, the tonsils may need to be removed.  Relieving your symptoms    Don t smoke, and avoid secondhand smoke.    For children, try throat sprays or Popsicles. Adults and older children may try lozenges.    Drink warm liquids to soothe the throat and help thin mucus. Avoid alcohol, spicy foods, and acidic drinks such as orange juice. These can irritate the throat.    Gargle with warm saltwater (1 teaspoon of salt to 8 ounces of warm water).    Use a humidifier to keep air  "moist and relieve throat dryness.    Try over-the-counter pain relievers such as acetaminophen or ibuprofen. Use as directed, and don t exceed the recommended dose. Don t give aspirin to children.   Are antibiotics needed?  If your sore throat is due to a bacterial infection, antibiotics may speed healing and prevent complications. Although group A streptococcus (\"strep throat\" or GAS) is the major treatable infection for a sore throat, GAS causes only 5% to 15% of sore throats in adults who seek medical care. Most sore throats are caused by cold or flu viruses. And antibiotics don t treat viral illness. In fact, using antibiotics when they re not needed may produce bacteria that are harder to kill. Your healthcare provider will prescribe antibiotics only if he or she thinks they are likely to help.  If antibiotics are prescribed  Take the medicine exactly as directed. Be sure to finish your prescription even if you re feeling better. And be sure to ask your healthcare provider or pharmacist what side effects are common and what to do about them.  Is surgery needed?  In some cases, tonsils need to be removed. This is often done as outpatient (same-day) surgery. Your healthcare provider may advise removing the tonsils in cases of:    Several severe bouts of tonsillitis in a year.  Severe  episodes include those that lead to missed days of school or work, or that need to be treated with antibiotics.    Tonsillitis that causes breathing problems during sleep    Tonsillitis caused by food particles collecting in pouches in the tonsils (cryptic tonsillitis)  Call your healthcare provider if any of the following occur:    Symptoms worsen, or new symptoms develop.    Swollen tonsils make breathing difficult.    The pain is severe enough to keep you from drinking liquids.    A skin rash, hives, or wheezing develops. Any of these could signal an allergic reaction to antibiotics.    Symptoms don t improve within a " week.    Symptoms don t improve within 2 to 3 days of starting antibiotics.   Date Last Reviewed: 10/1/2016    6395-4299 [employer]. [address] [place] [address], [place] [address]. All rights reserved. This information is not intended as a substitute for professional medical care. Always follow your healthcare professional's instructions.          24 Hour Appointment Hotline       To make an appointment at any [facility], call [phone] ([phone]). If you don't have a family doctor or clinic, we will help you find one. [facility] are conveniently located to serve the needs of you and your family.             Review of your medicines      START taking        Dose / Directions Last dose taken    clindamycin 300 MG capsule   Commonly known as:  CLEOCIN   Dose:  300 mg   Quantity:  40 capsule        Take 1 capsule (300 mg) by mouth 4 times daily   Refills:  0          Our records show that you are taking the medicines listed below. If these are incorrect, please call your family doctor or clinic.        Dose / Directions Last dose taken    acetaminophen-codeine 300-30 MG per tablet   Commonly known as:  TYLENOL #3   Dose:  1-2 tablet   Quantity:  120 tablet        Take 1-2 tablets by mouth every 4 hours as needed   Refills:  0        citalopram 40 MG tablet   Commonly known as:  celeXA   Dose:  40 mg   Quantity:  90 tablet        Take 1 tablet (40 mg) by mouth daily   Refills:  3        IBUPROFEN PO   Dose:  600 mg        Take 600 mg by mouth   Refills:  0        meclizine 25 MG tablet   Commonly known as:  ANTIVERT   Dose:  25 mg   Quantity:  30 tablet        Take 1 tablet (25 mg) by mouth every 6 hours as needed for dizziness   Refills:  6        OMEGA-3 FISH OIL PO   Dose:  2 capsule        Take 2 capsules by mouth daily   Refills:  0        ondansetron 4 MG ODT tab   Commonly known as:  ZOFRAN ODT   Dose:  4-8 mg   Quantity:  30 tablet        Take 1-2 tablets (4-8 mg) by mouth every 8  hours as needed for nausea   Refills:  6        SYNTHROID 112 MCG tablet   Dose:  112 mcg   Quantity:  90 tablet   Generic drug:  levothyroxine        Take 1 tablet (112 mcg) by mouth daily   Refills:  3          ASK your doctor about these medications        Dose / Directions Last dose taken    cephALEXin 500 MG capsule   Commonly known as:  KEFLEX   Dose:  500 mg   Quantity:  20 capsule   Ask about: Should I take this medication?        Take 1 capsule (500 mg) by mouth 2 times daily for 10 days   Refills:  0                Prescriptions were sent or printed at these locations (1 Prescription)                   [facility] [place], [place] - [address]    [address] [facility] [address]    Telephone:  [phone]   Fax:  [phone]   Hours:                  E-Prescribed (1 of 1)         clindamycin (CLEOCIN) 300 MG capsule                Orders Needing Specimen Collection     None      Pending Results     No orders found from 6/19/2017 to 6/22/2017.            Pending Culture Results     No orders found from 6/19/2017 to 6/22/2017.            Pending Results Instructions     If you had any lab results that were not finalized at the time of your Discharge, you can call the ED Lab Result RN at [phone]. You will be contacted by this team for any positive Lab results or changes in treatment. The nurses are available 7 days a week from 10A to 6:30P.  You can leave a message 24 hours per day and they will return your call.        Test Results From Your Hospital Stay               Thank you for choosing [facility]       Thank you for choosing [facility] for your care. Our goal is always to provide you with excellent care. Hearing back from our patients is one way we can continue to improve our services. Please take a few minutes to complete the written survey that you may receive in the mail after you visit with us. Thank you!        [facility] Information     [employer] gives you secure access to  your electronic health record. If you see a primary care provider, you can also send messages to your care team and make appointments. If you have questions, please call your primary care clinic.  If you do not have a primary care provider, please call [phone] and they will assist you.        Care EveryWhere ID     This is your Care EveryWhere ID. This could be used by other organizations to access your [facility] medical records  [id number]        Equal Access to Services     DIANNA JOHNSON : Can Govea, rudy jordan, manuel garcia, chico sharif. So [facility] [phone].    ATENCIÓN: Si habla español, tiene a evans disposición servicios gratuitos de asistencia lingüística. Llame al [phone].    We comply with applicable federal civil rights laws and [place] laws. We do not discriminate on the basis of race, color, national origin, age, disability sex, sexual orientation or gender identity.            After Visit Summary       This is your record. Keep this with you and show to your community pharmacist(s) and doctor(s) at your next visit.

## 2017-06-21 NOTE — ED PROVIDER NOTES
History     Chief Complaint   Patient presents with     Pharyngitis     HPI  Prakash Golden is a 62 year old female who presents with return of sore throat today and it had resolved after recent treatment of strep pharyngitis on 06/10/2017.  Pt reports last dose of antibiotic was yesterday am.  Pt states she started feeling clammy, sweaty yesterday afternoon.  Pt reports feeling feverish but never documented.  It hurts to swallow on the right side of the throat and pt reports feeling the sensation of something present in throat.  Pt reports also feeling short of breath but denies chest pain.  Pt states she chronically feels short of breath and this is not new and she has never been evaluated for this.  Pt also reports having a yeast infection from the recent antibioitic with symptoms of increased vaginal discharge and itching and burning vaginally.  Pt reports she will try OTC remedies and not have speculum exam today.    I have reviewed the Medications, Allergies, Past Medical and Surgical History, and Social History in the Epic system.    Allergies:   Allergies   Allergen Reactions     Amitriptyline Other (See Comments)     Shaking, couldn't walk or talk     Levothyroxine Other (See Comments)     Bruising all over her body- can only take brand-name Synthroid       Penicillins Rash       Current Facility-Administered Medications on File Prior to Encounter:  lidocaine 1 % injection     Current Outpatient Prescriptions on File Prior to Encounter:  [] cephALEXin (KEFLEX) 500 MG capsule Take 1 capsule (500 mg) by mouth 2 times daily for 10 days   SYNTHROID 112 MCG tablet Take 1 tablet (112 mcg) by mouth daily   citalopram (CELEXA) 40 MG tablet Take 1 tablet (40 mg) by mouth daily   meclizine (ANTIVERT) 25 MG tablet Take 1 tablet (25 mg) by mouth every 6 hours as needed for dizziness (Patient not taking: Reported on 3/28/2017)   ondansetron (ZOFRAN ODT) 4 MG disintegrating tablet Take 1-2 tablets (4-8 mg) by  mouth every 8 hours as needed for nausea (Patient not taking: Reported on 3/28/2017)   Omega-3 Fatty Acids (OMEGA-3 FISH OIL PO) Take 2 capsules by mouth daily   acetaminophen-codeine (TYLENOL #3) 300-30 MG per tablet Take 1-2 tablets by mouth every 4 hours as needed (Patient not taking: Reported on 3/28/2017)   IBUPROFEN PO Take 600 mg by mouth     Patient Active Problem List   Diagnosis     Hypothyroidism     Osteoarthritis     Foot pain     Intractable neuropathic pain secondary to bilateral plantar fasciitis     Plantar fasciitis     Vitamin D deficiency     GERD (gastroesophageal reflux disease)     HYPERLIPIDEMIA LDL GOAL <130     Health Care Home     Mild major depression (H)     Advanced directives, counseling/discussion     Melanocytic nevus     Neoplasm of uncertain behavior of skin     Angioma     COPD (chronic obstructive pulmonary disease) (H)     Primary thunderclap headache     Headache     Intractable headache     Panic attack     Past Surgical History:   Procedure Laterality Date     C LIGATE FALLOPIAN TUBE  8/1989    Laparoscopic tubal occlusion by cautery     HC REMOVE TONSILS/ADENOIDS,12+ Y/O      T & A 12+y.o.     LAPAROSCOPIC APPENDECTOMY  5/19/2013    Procedure: LAPAROSCOPIC APPENDECTOMY;;  Surgeon: Christiano Wall MD;  Location: [facility] OR     SURGICAL HISTORY OF -   2002    Eye Surgery-B/L varices     SURGICAL HISTORY OF -       four normal spontaneous vaginal deliveries     SURGICAL HISTORY OF -   8/1977    Proctoscopy     SURGICAL HISTORY OF -   12/1976    RML Episiotomy     SURGICAL HISTORY OF -   2/2002    Bilateral orbital fat decompression       Review of Systems  10 point ROS of systems including Constitutional, Eyes, Respiratory, Cardiovascular, Gastroenterology, Genitourinary, Integumentary, Muscularskeletal, Psychiatric were all negative except for pertinent positives noted in my HPI.    Physical Exam   BP: 132/75  Pulse: 91  Temp: 98.1  F (36.7  C)  Resp: 18  SpO2: 96 %  Physical  Exam   Constitutional: She is oriented to person, place, and time. She appears well-developed and well-nourished. She is cooperative. She appears ill. She appears distressed (mild discomfort).   HENT:   Head: Normocephalic and atraumatic.   Right Ear: Hearing, tympanic membrane, external ear and ear canal normal.   Left Ear: Hearing, tympanic membrane, external ear and ear canal normal.   Nose: Mucosal edema (pale and boggy) and rhinorrhea present.   Mouth/Throat: Uvula is midline. Posterior oropharyngeal erythema present. No oropharyngeal exudate, posterior oropharyngeal edema or tonsillar abscesses.   Eyes: Conjunctivae are normal. Pupils are equal, round, and reactive to light.   Neck: Normal range of motion. Neck supple.   Cardiovascular: Normal rate, regular rhythm and normal heart sounds.  Exam reveals no gallop and no friction rub.    No murmur heard.  Pulmonary/Chest: Effort normal and breath sounds normal. No respiratory distress. She has no wheezes. She has no rales. She exhibits no tenderness.   Lymphadenopathy:     She has cervical adenopathy (mild anterior chain lymphadenopathy).   Neurological: She is alert and oriented to person, place, and time.   Skin: Skin is warm. No rash noted. She is diaphoretic. No erythema. No pallor.   Psychiatric: She has a normal mood and affect. Her behavior is normal. Thought content normal.   Nursing note and vitals reviewed.      ED Course     ED Course     Procedures    Labs Ordered and Resulted from Time of ED Arrival Up to the Time of Departure from the ED - No data to display    Assessments & Plan (with Medical Decision Making)     I have reviewed the nursing notes.    I have reviewed the findings, diagnosis, plan and need for follow up with the patient.  Prakash Golden is a 62 year old female who presents with return of sore throat today and it had resolved after recent treatment of strep pharyngitis on 06/10/2017.  Pt reports last dose of antibiotic was  yesterday am.  Pt states she started feeling clammy, sweaty yesterday afternoon.  Pt reports feeling feverish but never documented.  It hurts to swallow on the right side of the throat and pt reports feeling the sensation of something present in throat.  Pt reports also feeling short of breath but denies chest pain.  Pt states she chronically feels short of breath and this is not new and she has never been evaluated for this.  Pt also reports having a yeast infection from the recent antibioitic with symptoms of increased vaginal discharge and itching and burning vaginally.  Pt reports she will try OTC remedies and not have speculum exam today.  Exam reveals mildly ill appearing female with posterior pharynx erythema and recent completion of cephalexin for strep pharyngitis and resumption of symptoms of strep pharyngitis.  Deferred testing for strep as finished antibiotic yesterday and will treat with clindamycin to may be resistant to cephalexin.  Encouraged pt to return if symptoms are not improving over the next three days.      Discharge Medication List as of 6/21/2017  3:21 PM      START taking these medications    Details   clindamycin (CLEOCIN) 300 MG capsule Take 1 capsule (300 mg) by mouth 4 times daily, Disp-40 capsule, R-0, E-Prescribe             Final diagnoses:   Acute pharyngitis due to other specified organisms       6/21/2017   [facility] EMERGENCY DEPARTMENT     Muriel Corado APRN CNP  06/21/17 8517

## 2017-06-22 ENCOUNTER — TELEPHONE (OUTPATIENT)
Dept: NURSING | Facility: CLINIC | Age: 62
End: 2017-06-22

## 2017-06-22 ENCOUNTER — NURSE TRIAGE (OUTPATIENT)
Dept: NURSING | Facility: CLINIC | Age: 62
End: 2017-06-22

## 2017-06-22 NOTE — TELEPHONE ENCOUNTER
"  Reason for Disposition    Patient sounds very sick or weak to the triager    Additional Information    Negative: Severe difficulty breathing (e.g., struggling for each breath, speaks in single words)    Negative: Shock suspected (e.g., cold/pale/clammy skin, too weak to stand, low BP, rapid pulse)    Negative: Difficult to awaken or acting confused  (e.g., disoriented, slurred speech)    Negative: Passed out (i.e., lost consciousness, collapsed and was not responding)    Negative: Visible sweat on face or sweat dripping down face    Negative: Sounds like a life-threatening emergency to the triager    Negative: Followed an abdomen (stomach) injury    Negative: Chest pain    Negative: [1] SEVERE pain (e.g., excruciating) AND [2] present > 1 hour    Negative: [1] Pain lasts > 10 minutes AND [2] age > 50    Negative: [1] Pain lasts > 10 minutes AND [2] age > 40 AND [3] associated chest, arm, neck, upper back or jaw pain    Negative: [1] Pain lasts > 10 minutes AND [2] age > 35 AND [3] at least one cardiac risk factor (i.e., hypertension, diabetes, obesity, smoker or strong family history of heart disease)    Negative: [1] Pain lasts > 10 minutes AND [2] history of heart disease (i.e., heart attack, bypass surgery, angina, angioplasty, CHF; not just a heart murmur)    Negative: [1] Pain lasts > 10 minutes AND [2] difficulty breathing    Negative: [1] Vomiting AND [2] contains red blood  (Exception: few streaks and only occurred once)    Negative: [1] Vomiting AND [2] contains black (\"coffee ground\") material    Negative: Blood in bowel movements  (Exception: Blood on surface of BM with constipation)    Negative: Black or tarry bowel movements  (Exception: chronic-unchanged  black-grey bowel movements AND is taking iron pills or Pepto-bismol)    Negative: [1] Pregnant > 24 weeks AND [2] hand or face swelling    Protocols used: ABDOMINAL PAIN - UPPER-ADULT-AH  \"I had strep throat 6/10 and the medication did not take care " "of it. I Was seen in the ER yesterday and they gave me Clindamycin. Since I started taking the medication, I am having severe heartburn. I have taken Tums and Pepto Bismol without relief. I am having pain in the center of my stomach and it burns from my throat all the way down into my stomach.\" Caller is not interested in going to the ER at this time. Says that she \"will wait and go in if the pain gets worse.\" She agreed to have this nurse leave a note for PCP.  Amanda Jerez RN  [facility]      "

## 2017-06-22 NOTE — TELEPHONE ENCOUNTER
Patient was treated for strep throat 6/10/17 and medication did not work. Seen again in the ER last night and strep test was positive. Since taking Clindamycin she has severe heartburn. Please call back re an alternate prescription.   Amanda Jerez RN  [facility]

## 2017-06-23 ENCOUNTER — TELEPHONE (OUTPATIENT)
Dept: FAMILY MEDICINE | Facility: CLINIC | Age: 62
End: 2017-06-23

## 2017-06-23 DIAGNOSIS — J02.0 STREP THROAT: Primary | ICD-10-CM

## 2017-06-23 RX ORDER — AZITHROMYCIN 250 MG/1
TABLET, FILM COATED ORAL
Qty: 6 TABLET | Refills: 0 | Status: SHIPPED | OUTPATIENT
Start: 2017-06-23 | End: 2017-09-05

## 2017-06-23 NOTE — TELEPHONE ENCOUNTER
Dr. Pat,    S-(situation): medication reaction    B-(background): severe heartburn from the clindamycin she was started on for strep throat on 6/21/17.  She has tried eating  With it, tums, pepto bismol, milk nothing seems to help it and it is quite painful.    A-(assessment): medication reaction, strep throat    R-(recommendations): [facility] for a different antibiotic.  Was on keflex prior but that antibiotic did not work on the strep throat. Yessy HOOD RN

## 2017-06-23 NOTE — TELEPHONE ENCOUNTER
Patient notified of Rx sent to pharmacy by MD  Patient verbalized understaniding and agreed with MD's plan    Lashawn [facility] Rn

## 2017-07-01 ENCOUNTER — HEALTH MAINTENANCE LETTER (OUTPATIENT)
Age: 62
End: 2017-07-01

## 2017-08-17 ENCOUNTER — TELEPHONE (OUTPATIENT)
Dept: FAMILY MEDICINE | Facility: CLINIC | Age: 62
End: 2017-08-17

## 2017-08-17 ENCOUNTER — HOSPITAL ENCOUNTER (EMERGENCY)
Facility: CLINIC | Age: 62
Discharge: HOME | End: 2017-08-17
Attending: EMERGENCY MEDICINE | Admitting: EMERGENCY MEDICINE
Payer: COMMERCIAL

## 2017-08-17 VITALS
TEMPERATURE: 98.3 F | OXYGEN SATURATION: 95 % | HEART RATE: 87 BPM | RESPIRATION RATE: 20 BRPM | SYSTOLIC BLOOD PRESSURE: 167 MMHG | DIASTOLIC BLOOD PRESSURE: 82 MMHG

## 2017-08-17 DIAGNOSIS — M54.12 CERVICAL RADICULOPATHY: ICD-10-CM

## 2017-08-17 PROCEDURE — 99283 EMERGENCY DEPT VISIT LOW MDM: CPT | Performed by: EMERGENCY MEDICINE

## 2017-08-17 PROCEDURE — 99283 EMERGENCY DEPT VISIT LOW MDM: CPT

## 2017-08-17 RX ORDER — HYDROCODONE BITARTRATE AND ACETAMINOPHEN 5; 325 MG/1; MG/1
1-2 TABLET ORAL EVERY 6 HOURS PRN
Qty: 10 TABLET | Refills: 0 | Status: SHIPPED | OUTPATIENT
Start: 2017-08-17 | End: 2017-09-05

## 2017-08-17 RX ORDER — LORAZEPAM 1 MG/1
1 TABLET ORAL EVERY 6 HOURS PRN
Qty: 10 TABLET | Refills: 0 | Status: SHIPPED | OUTPATIENT
Start: 2017-08-17 | End: 2017-09-05

## 2017-08-17 NOTE — DISCHARGE INSTRUCTIONS
Understanding Cervical Radiculopathy    Cervical radiculopathy is irritation or inflammation of a nerve root in the neck. It causes neck pain and other symptoms that may spread into the chest or down the arm. To understand this condition, it helps to understand the parts of the spine:    Vertebrae. These are bones that stack to form the spine. The cervical spine contains the 7 vertebrae in the neck.    Disks. These are soft pads of tissue between the vertebrae. They act as shock absorbers for the spine.    The spinal canal. This is a tunnel formed within the stacked vertebrae. The spinal cord runs through this canal.    Nerves. These branch off the spinal cord. As they leave the spinal canal, nerves pass through openings between the vertebrae. The nerve root is the part of the nerve that is closest to the spinal cord.   With cervical radiculopathy, nerve roots in the neck become irritated. This leads to pain and symptoms that can travel to the nerves that go from the spinal cord down the arms and into the torso.  What causes cervical radiculopathy?  Aging, injury, poor posture, and other issues can lead to problems in the neck. These problems may then irritate nerve roots. These include:    Damage to a disk in the cervical spine. The damaged disk may then press on nearby nerve roots.    Degeneration from wear and tear, and aging. This can lead to narrowing (stenosis) of the openings between the vertebrae. The narrowed openings press on nerve roots as they leave the spinal canal.    An unstable spine. This is when a vertebra slips forward. It can then press on a nerve root.  There are other, less common causes of pressure on nerves in the neck. These include infection, cysts, and tumors.  Symptoms of cervical radiculopathy  These include:    Neck pain    Pain, numbness, tingling, or weakness that travels down the arm    Loss of neck movement    Muscle spasms  Treatment for cervical radiculopathy  In most cases,  your healthcare provider will first try treatments that help relieve symptoms. These may include:    Prescription or over-the-counter pain medicines. These help relieve pain and swelling.    Cold packs. These help reduce pain.    Resting. This involves avoiding positions and activities that increase pain.    Neck brace (cervical collar). This can help relieve inflammation and pain.    Physical therapy, including exercises and stretches. This can help decrease pain and increase movement and function.    Shots of medicinesaround the nerve roots. This is done to help relieve symptoms for a time.  In some cases, your healthcare provider may advise surgery to fix the underlying problem. This depends on the cause, the symptoms, and how long the pain has lasted.  Possible complications  Over time, an irritated and inflamed nerve may become damaged. This may lead to long-lasting (permanent) numbness or weakness. If symptoms change suddenly or get worse, be sure to let your healthcare provider know.     When to call your healthcare provider  Call your healthcare provider right away if you have any of these:    New pain or pain that gets worse    New or increasing weakness, numbness, or tingling in your arm or hand    Bowel or bladder changes   Date Last Reviewed: 3/10/2016    1519-7351 [employer]. [address] [place] [address], [place] [address]. All rights reserved. This information is not intended as a substitute for professional medical care. Always follow your healthcare professional's instructions.      Call [phone] to schedule physical therapy, call Dr. Burton's office to schedule acupuncture, follow up with Dr. Pat.

## 2017-08-17 NOTE — TELEPHONE ENCOUNTER
Patient calling to request pain medication for bone spurs in neck.  Last clinic appointment   03/27/2017  Patient aware Dr RODY Pat in not in clinic today   Please call and advise  Verito Feng- ALLI

## 2017-08-17 NOTE — TELEPHONE ENCOUNTER
Patient states she has been dealing with neck spurs all week.  Nothing is working for her to relieve pain.  RN reviewed [facility] policy of pain medication requests require OV.  Patient states she will go to [facility] as her pain is that severe.    Beth KAISER RN

## 2017-08-17 NOTE — ED AVS SNAPSHOT
[facility] Emergency Department    [address] [address]    Phone:  [phone]    Fax:  [phone]                                       Prakash Golden   MRN: [id number]    Department:  [facility] Emergency Department   Date of Visit:  8/17/2017           After Visit Summary Signature Page     I have received my discharge instructions, and my questions have been answered. I have discussed any challenges I see with this plan with the nurse or doctor.    ..........................................................................................................................................  Patient/Patient Representative Signature      ..........................................................................................................................................  Patient Representative Print Name and Relationship to Patient    ..................................................               ................................................  Date                                            Time    ..........................................................................................................................................  Reviewed by Signature/Title    ...................................................              ..............................................  Date                                                            Time

## 2017-08-17 NOTE — ED NOTES
Pt presents to ED with complaints of upper back/neck pain. This has been getting worse over the past week. Pt notes that the pain goes down into her right shoulder and right arm. She states this History of spurs in her neck that had similar symptoms, but that didn't go down into her shoulder like this does. Pt has also had some left hip pain that has been going on for a couple weeks, but believes that is unrelated. Pt otherwise healthy. Some diarrhea yesterday, but that has resolved today.

## 2017-08-17 NOTE — ED PROVIDER NOTES
History     Chief Complaint   Patient presents with     Neck Pain     from back of arm down R arm, R hand is weak     HPI  Prakash Golden is a 62 year old female who presents to the ED today for evaluation of a week of neck pain with radiation of pain into her right arm. Patient also admits to weakness in her right arm and has been dropping items like cups, pens, and her toothbrush. She has tried alleviating her pain by applying ice to the area, taking ibuprofen and a muscle relaxer with no relief. She reports she has been having difficulty sitting, standing, and lying down. She denies a fever but feels hot and diaphoretic at times. There are no reports of injury or trauma to the neck. Patient had an MRI of her cervical spine on 01/09/2017 which was remarkable for degenerative disc changes, loss of disc height and ucinate spurs. Patient has had no prior neck surgeries.     MR CERVICAL SPINE W/O CONTRAST   1/9/2016 3:47 PM   HISTORY: neck pain     TECHNIQUE:  Multiplanar multisequence MRI of the cervical spine  without gadolinium contrast.      COMPARISON:  None.     FINDINGS: There is reversal of the cervical lordosis. The spinal cord  appears normal. The paraspinal soft tissues appear normal.  The bone  marrow has normal signal intensity.          C2-C3:  Normal disc, facet joints, spinal canal and neural foramina.     C3-C4:  Normal disc, facet joints, spinal canal and neural foramina.     C4-C5:  Degeneration of the disc. Loss of disc space height. Diffuse  annular disc bulge causing mild mass effect on the dural sac. Central  canal normal. Moderate right foraminal stenosis and moderate to severe  left foraminal stenosis due to uncinate spurs.     C5-C6:  Degeneration of the disc. Loss of disc space height. Diffuse  annular disc bulge there is central canal normal. Moderate right and  moderate-severe left foraminal stenosis due to loss of disc space  height and uncinate spurs.     C6-C7:  Degeneration of the  disc. Diffuse annular disc bulge. Central  canal normal. Moderate right and mild left foraminal stenosis due to  loss of disc space height and uncinate spurs.     C7-T1: Normal disc, facet joints, spinal canal and neural foramina.     IMPRESSION  IMPRESSION:    1. Multilevel degenerative change.  2. No focal left-sided disc herniations are seen.  3. The most significant left-sided findings are at the C4-5 and C5-6  where there is moderate-severe left C4-5 and C5-6 foraminal stenosis  due to loss of disc space height and uncinate spurs.     CATRACHO CAMARILLO MD    Social History     Social History     Marital status: Single     Spouse name: N/A     Number of children: N/A     Years of education: N/A     Occupational History     Not on file.     Social History Main Topics     Smoking status: Current Some Day Smoker     Packs/day: 0.40     Years: 35.00     Types: Cigarettes     Last attempt to quit: 11/15/2012     Smokeless tobacco: Never Used      Comment: very little     Alcohol use Yes      Comment: rare     Drug use: Yes     Sexual activity: Yes     Partners: Male     Other Topics Concern     Parent/Sibling W/ Cabg, Mi Or Angioplasty Before 65f 55m? No     Social History Narrative     I have reviewed the Medications, Allergies, Past Medical and Surgical History, and Social History in the Epic system.    Allergies:   Allergies   Allergen Reactions     Amitriptyline Other (See Comments)     Shaking, couldn't walk or talk     Levothyroxine Other (See Comments)     Bruising all over her body- can only take brand-name Synthroid       Penicillins Rash         Current Facility-Administered Medications on File Prior to Encounter:  lidocaine 1 % injection     Current Outpatient Prescriptions on File Prior to Encounter:  azithromycin (ZITHROMAX Z-REMIGIO) 250 MG tablet 2 tabs stat and 1 tab on Day 2-5   clindamycin (CLEOCIN) 300 MG capsule Take 1 capsule (300 mg) by mouth 4 times daily   SYNTHROID 112 MCG tablet Take 1 tablet (112 mcg)  by mouth daily   citalopram (CELEXA) 40 MG tablet Take 1 tablet (40 mg) by mouth daily   meclizine (ANTIVERT) 25 MG tablet Take 1 tablet (25 mg) by mouth every 6 hours as needed for dizziness (Patient not taking: Reported on 3/28/2017)   ondansetron (ZOFRAN ODT) 4 MG disintegrating tablet Take 1-2 tablets (4-8 mg) by mouth every 8 hours as needed for nausea (Patient not taking: Reported on 3/28/2017)   Omega-3 Fatty Acids (OMEGA-3 FISH OIL PO) Take 2 capsules by mouth daily   acetaminophen-codeine (TYLENOL #3) 300-30 MG per tablet Take 1-2 tablets by mouth every 4 hours as needed (Patient not taking: Reported on 3/28/2017)   IBUPROFEN PO Take 600 mg by mouth       Patient Active Problem List   Diagnosis     Hypothyroidism     Osteoarthritis     Foot pain     Intractable neuropathic pain secondary to bilateral plantar fasciitis     Plantar fasciitis     Vitamin D deficiency     GERD (gastroesophageal reflux disease)     HYPERLIPIDEMIA LDL GOAL <130     Health Care Home     Mild major depression (H)     Advanced directives, counseling/discussion     Melanocytic nevus     Neoplasm of uncertain behavior of skin     Angioma     COPD (chronic obstructive pulmonary disease) (H)     Primary thunderclap headache     Headache     Intractable headache     Panic attack       Past Surgical History:   Procedure Laterality Date     C LIGATE FALLOPIAN TUBE  8/1989    Laparoscopic tubal occlusion by cautery     HC REMOVE TONSILS/ADENOIDS,12+ Y/O      T & A 12+y.o.     LAPAROSCOPIC APPENDECTOMY  5/19/2013    Procedure: LAPAROSCOPIC APPENDECTOMY;;  Surgeon: Christiano Wall MD;  Location: [facility] OR     SURGICAL HISTORY OF -   2002    Eye Surgery-B/L varices     SURGICAL HISTORY OF -       four normal spontaneous vaginal deliveries     SURGICAL HISTORY OF -   8/1977    Proctoscopy     SURGICAL HISTORY OF -   12/1976    RML Episiotomy     SURGICAL HISTORY OF -   2/2002    Bilateral orbital fat decompression       Social History  "  Substance Use Topics     Smoking status: Current Some Day Smoker     Packs/day: 0.40     Years: 35.00     Types: Cigarettes     Last attempt to quit: 11/15/2012     Smokeless tobacco: Never Used      Comment: very little     Alcohol use Yes      Comment: rare       Most Recent Immunizations   Administered Date(s) Administered     Influenza (IIV3) 09/24/2014     TD (ADULT, 7+) 05/05/1993     TDAP Vaccine (Adacel) 03/21/2012       BMI: Estimated body mass index is 31.95 kg/(m^2) as calculated from the following:    Height as of 3/28/17: 1.702 m (5' 7\").    Weight as of 6/10/17: 92.5 kg (204 lb).      Review of Systems  All other systems are reviewed and are negative.    Physical Exam   BP: 167/82  Pulse: 87  Temp: 98.3  F (36.8  C)  Resp: 20  SpO2: 95 %  Physical Exam  Nontoxic and in no respiratory distress alert and oriented ×3  Head atraumatic normocephalic  Neck supple full active range of motion, discomfort with extreme of rotation both directions  Bilateral paracervical muscular tenderness  Strength 5/5 throughout upper extremities  Sensation intact to light touch  ED Course     ED Course     Procedures             Critical Care time:  none               Labs Ordered and Resulted from Time of ED Arrival Up to the Time of Departure from the ED - No data to display  No results found for this or any previous visit (from the past 24 hour(s)).    Medications - No data to display    5:44 PM Patient assessed.     Assessments & Plan (with Medical Decision Making)  62-year-old female presents with neck pain and arm pain.  No focal neurologic findings on exam.  No indication for imaging today.  Recommend follow-up primary care.  Short course of lorazepam hydrocodone.  Warned regarding side effects.  Return criteria reviewed.       I have reviewed the nursing notes.    I have reviewed the findings, diagnosis, plan and need for follow up with the patient.       Discharge Medication List as of 8/17/2017  6:02 PM      START " taking these medications    Details   LORazepam (ATIVAN) 1 MG tablet Take 1 tablet (1 mg) by mouth every 6 hours as needed for muscle spasms, Disp-10 tablet, R-0, Local Print      HYDROcodone-acetaminophen (NORCO) 5-325 MG per tablet Take 1-2 tablets by mouth every 6 hours as needed for moderate to severe pain, Disp-10 tablet, R-0, Local Print             Final diagnoses:   Cervical radiculopathy     This document serves as a record of the services and decisions personally performed and made by Aaron Solis MD. It was created on HIS/HER behalf by Daria Jensen, a trained medical scribe. The creation of this document is based the provider's statements to the medical scribe.  Daria Jensen 5:44 PM 8/17/2017    Provider:   The information in this document, created by the medical scribe for me, accurately reflects the services I personally performed and the decisions made by me. I have reviewed and approved this document for accuracy prior to leaving the patient care area.  Aaron Solis MD 5:44 PM 8/17/2017 8/17/2017   [facility] EMERGENCY DEPARTMENT     Aaron Solis MD  08/21/17 [id number]

## 2017-08-17 NOTE — ED AVS SNAPSHOT
[facility] Emergency Department    [address] [facility] [address]    Phone:  [phone]    Fax:  [phone]                                       Prakash Golden   MRN: [id number]    Department:  [facility] Emergency Department   Date of Visit:  8/17/2017           Patient Information     Date Of Birth          1955        Your diagnoses for this visit were:     Cervical radiculopathy        You were seen by Aaron Solis MD.        Discharge Instructions         Understanding Cervical Radiculopathy    Cervical radiculopathy is irritation or inflammation of a nerve root in the neck. It causes neck pain and other symptoms that may spread into the chest or down the arm. To understand this condition, it helps to understand the parts of the spine:    Vertebrae. These are bones that stack to form the spine. The cervical spine contains the 7 vertebrae in the neck.    Disks. These are soft pads of tissue between the vertebrae. They act as shock absorbers for the spine.    The spinal canal. This is a tunnel formed within the stacked vertebrae. The spinal cord runs through this canal.    Nerves. These branch off the spinal cord. As they leave the spinal canal, nerves pass through openings between the vertebrae. The nerve root is the part of the nerve that is closest to the spinal cord.   With cervical radiculopathy, nerve roots in the neck become irritated. This leads to pain and symptoms that can travel to the nerves that go from the spinal cord down the arms and into the torso.  What causes cervical radiculopathy?  Aging, injury, poor posture, and other issues can lead to problems in the neck. These problems may then irritate nerve roots. These include:    Damage to a disk in the cervical spine. The damaged disk may then press on nearby nerve roots.    Degeneration from wear and tear, and aging. This can lead to narrowing (stenosis) of the openings between the vertebrae. The narrowed  openings press on nerve roots as they leave the spinal canal.    An unstable spine. This is when a vertebra slips forward. It can then press on a nerve root.  There are other, less common causes of pressure on nerves in the neck. These include infection, cysts, and tumors.  Symptoms of cervical radiculopathy  These include:    Neck pain    Pain, numbness, tingling, or weakness that travels down the arm    Loss of neck movement    Muscle spasms  Treatment for cervical radiculopathy  In most cases, your healthcare provider will first try treatments that help relieve symptoms. These may include:    Prescription or over-the-counter pain medicines. These help relieve pain and swelling.    Cold packs. These help reduce pain.    Resting. This involves avoiding positions and activities that increase pain.    Neck brace (cervical collar). This can help relieve inflammation and pain.    Physical therapy, including exercises and stretches. This can help decrease pain and increase movement and function.    Shots of medicinesaround the nerve roots. This is done to help relieve symptoms for a time.  In some cases, your healthcare provider may advise surgery to fix the underlying problem. This depends on the cause, the symptoms, and how long the pain has lasted.  Possible complications  Over time, an irritated and inflamed nerve may become damaged. This may lead to long-lasting (permanent) numbness or weakness. If symptoms change suddenly or get worse, be sure to let your healthcare provider know.     When to call your healthcare provider  Call your healthcare provider right away if you have any of these:    New pain or pain that gets worse    New or increasing weakness, numbness, or tingling in your arm or hand    Bowel or bladder changes   Date Last Reviewed: 3/10/2016    1055-4229 [employer]. [address] [place] [address], [place] [address]. All rights reserved. This information is not intended as a substitute for  "professional medical care. Always follow your healthcare professional's instructions.      Call [phone] to schedule physical therapy, call Dr. Burton's office to schedule acupuncture, follow up with Dr. Pat.    24 Hour Appointment Hotline       To make an appointment at any [facility] clinic, call [phone] ([phone]). If you don't have a family doctor or clinic, we will help you find one. [facility] clinics are conveniently located to serve the needs of you and your family.          ED Discharge Orders     PHYSICAL THERAPY REFERRAL       *This therapy referral will be filtered to a centralized scheduling office at [facility] and the patient will receive a call to schedule an appointment at a [facility] location most convenient for them. *     [facility] provides Physical Therapy evaluation and treatment and many specialty services across the [facility] system.  If requesting a specialty program, please choose from the list below.    If you have not heard from the scheduling office within 2 business days, please call [phone] for all locations, with the exception of [place], please call [phone].  Treatment: Evaluation & Treatment  Special Instructions/Modalities:   Special Programs: None    Please be aware that coverage of these services is subject to the terms and limitations of your health insurance plan.  Call member services at your health plan with any benefit or coverage questions.      **Note to Provider:  If you are referring outside of [facility] for the therapy appointment, please list the name of the location in the \"special instructions\" above, print the referral and give to the patient to schedule the appointment.                     Review of your medicines      START taking        Dose / Directions Last dose taken    HYDROcodone-acetaminophen 5-325 MG per tablet   Commonly known as:  NORCO   Dose:  1-2 tablet   Quantity:  10 tablet        " Take 1-2 tablets by mouth every 6 hours as needed for moderate to severe pain   Refills:  0        LORazepam 1 MG tablet   Commonly known as:  ATIVAN   Dose:  1 mg   Quantity:  10 tablet        Take 1 tablet (1 mg) by mouth every 6 hours as needed for muscle spasms   Refills:  0          Our records show that you are taking the medicines listed below. If these are incorrect, please call your family doctor or clinic.        Dose / Directions Last dose taken    acetaminophen-codeine 300-30 MG per tablet   Commonly known as:  TYLENOL #3   Dose:  1-2 tablet   Quantity:  120 tablet        Take 1-2 tablets by mouth every 4 hours as needed   Refills:  0        azithromycin 250 MG tablet   Commonly known as:  ZITHROMAX Z-REMIGIO   Quantity:  6 tablet        2 tabs stat and 1 tab on Day 2-5   Refills:  0        citalopram 40 MG tablet   Commonly known as:  celeXA   Dose:  40 mg   Quantity:  90 tablet        Take 1 tablet (40 mg) by mouth daily   Refills:  3        clindamycin 300 MG capsule   Commonly known as:  CLEOCIN   Dose:  300 mg   Quantity:  40 capsule        Take 1 capsule (300 mg) by mouth 4 times daily   Refills:  0        IBUPROFEN PO   Dose:  600 mg        Take 600 mg by mouth   Refills:  0        meclizine 25 MG tablet   Commonly known as:  ANTIVERT   Dose:  25 mg   Quantity:  30 tablet        Take 1 tablet (25 mg) by mouth every 6 hours as needed for dizziness   Refills:  6        OMEGA-3 FISH OIL PO   Dose:  2 capsule        Take 2 capsules by mouth daily   Refills:  0        ondansetron 4 MG ODT tab   Commonly known as:  ZOFRAN ODT   Dose:  4-8 mg   Quantity:  30 tablet        Take 1-2 tablets (4-8 mg) by mouth every 8 hours as needed for nausea   Refills:  6        SYNTHROID 112 MCG tablet   Dose:  112 mcg   Quantity:  90 tablet   Generic drug:  levothyroxine        Take 1 tablet (112 mcg) by mouth daily   Refills:  3                Prescriptions were sent or printed at these locations (2 Prescriptions)                    Other Prescriptions                Printed at Department/Unit printer (2 of 2)         LORazepam (ATIVAN) 1 MG tablet               HYDROcodone-acetaminophen (NORCO) 5-325 MG per tablet                Orders Needing Specimen Collection     None      Pending Results     No orders found from 8/15/2017 to 8/18/2017.            Pending Culture Results     No orders found from 8/15/2017 to 8/18/2017.            Pending Results Instructions     If you had any lab results that were not finalized at the time of your Discharge, you can call the ED Lab Result RN at [phone]. You will be contacted by this team for any positive Lab results or changes in treatment. The nurses are available 7 days a week from 10A to 6:30P.  You can leave a message 24 hours per day and they will return your call.        Test Results From Your Hospital Stay               Thank you for choosing [facility]       Thank you for choosing [facility] for your care. Our goal is always to provide you with excellent care. Hearing back from our patients is one way we can continue to improve our services. Please take a few minutes to complete the written survey that you may receive in the mail after you visit with us. Thank you!        [employer] Information     [email or web address] gives you secure access to your electronic health record. If you see a primary care provider, you can also send messages to your care team and make appointments. If you have questions, please call your primary care clinic.  If you do not have a primary care provider, please call [phone] and they will assist you.        Care EveryWhere ID     This is your Care EveryWhere ID. This could be used by other organizations to access your [facility] medical records  [id number]        Equal Access to Services     MARYLU JOHNSON : Can Govea, walc jordan, qachico michele. So [facility] [phone].    ATENCIÓN: [place]  español, tiene a evans disposición servicios gratuitos de asistencia lingüística. Llyanci al [phone].    We comply with applicable federal civil rights laws and [place] laws. We do not discriminate on the basis of race, color, national origin, age, disability sex, sexual orientation or gender identity.            After Visit Summary       This is your record. Keep this with you and show to your community pharmacist(s) and doctor(s) at your next visit.

## 2017-08-22 ENCOUNTER — HOSPITAL ENCOUNTER (OUTPATIENT)
Dept: PHYSICAL THERAPY | Facility: CLINIC | Age: 62
Setting detail: THERAPIES SERIES
End: 2017-08-22
Attending: EMERGENCY MEDICINE
Payer: COMMERCIAL

## 2017-08-22 ENCOUNTER — MYC MEDICAL ADVICE (OUTPATIENT)
Dept: FAMILY MEDICINE | Facility: CLINIC | Age: 62
End: 2017-08-22

## 2017-08-22 PROCEDURE — G8985 CARRY GOAL STATUS: HCPCS | Mod: GP,CH | Performed by: PHYSICAL THERAPIST

## 2017-08-22 PROCEDURE — 97140 MANUAL THERAPY 1/> REGIONS: CPT | Mod: GP | Performed by: PHYSICAL THERAPIST

## 2017-08-22 PROCEDURE — 97110 THERAPEUTIC EXERCISES: CPT | Mod: GP | Performed by: PHYSICAL THERAPIST

## 2017-08-22 PROCEDURE — 40000718 ZZHC STATISTIC PT DEPARTMENT ORTHO VISIT: Performed by: PHYSICAL THERAPIST

## 2017-08-22 PROCEDURE — G8984 CARRY CURRENT STATUS: HCPCS | Mod: GP,CK | Performed by: PHYSICAL THERAPIST

## 2017-08-22 PROCEDURE — 97161 PT EVAL LOW COMPLEX 20 MIN: CPT | Mod: GP | Performed by: PHYSICAL THERAPIST

## 2017-08-22 ASSESSMENT — ANXIETY QUESTIONNAIRES
2. NOT BEING ABLE TO STOP OR CONTROL WORRYING: NOT AT ALL
7. FEELING AFRAID AS IF SOMETHING AWFUL MIGHT HAPPEN: NOT AT ALL
1. FEELING NERVOUS, ANXIOUS, OR ON EDGE: NOT AT ALL
3. WORRYING TOO MUCH ABOUT DIFFERENT THINGS: NOT AT ALL
5. BEING SO RESTLESS THAT IT IS HARD TO SIT STILL: NOT AT ALL
6. BECOMING EASILY ANNOYED OR IRRITABLE: NOT AT ALL
2. NOT BEING ABLE TO STOP OR CONTROL WORRYING: NOT AT ALL
IF YOU CHECKED OFF ANY PROBLEMS ON THIS QUESTIONNAIRE, HOW DIFFICULT HAVE THESE PROBLEMS MADE IT FOR YOU TO DO YOUR WORK, TAKE CARE OF THINGS AT HOME, OR GET ALONG WITH OTHER PEOPLE: NOT DIFFICULT AT ALL
1. FEELING NERVOUS, ANXIOUS, OR ON EDGE: NOT AT ALL
GAD7 TOTAL SCORE: 0
4. TROUBLE RELAXING: NOT AT ALL
3. WORRYING TOO MUCH ABOUT DIFFERENT THINGS: NOT AT ALL
6. BECOMING EASILY ANNOYED OR IRRITABLE: NOT AT ALL
5. BEING SO RESTLESS THAT IT IS HARD TO SIT STILL: NOT AT ALL

## 2017-08-22 ASSESSMENT — PATIENT HEALTH QUESTIONNAIRE - PHQ9
SUM OF ALL RESPONSES TO PHQ QUESTIONS 1-9: 1
10. IF YOU CHECKED OFF ANY PROBLEMS, HOW DIFFICULT HAVE THESE PROBLEMS MADE IT FOR YOU TO DO YOUR WORK, TAKE CARE OF THINGS AT HOME, OR GET ALONG WITH OTHER PEOPLE: NOT DIFFICULT AT ALL
5. POOR APPETITE OR OVEREATING: NOT AT ALL
SUM OF ALL RESPONSES TO PHQ QUESTIONS 1-9: 1

## 2017-08-22 NOTE — PROGRESS NOTES
08/22/17 0800   General Information   Type of Visit Initial OP Ortho PT Evaluation   Start of Care Date 08/22/17   Referring Physician Will   Patient/Family Goals Statement patient wants the pain to go away, pt states it bother her sitting and standing, stays home. pt states she also wanted to get back to dancing.    Orders Evaluate and Treat   Date of Order 08/17/17   Insurance Type Other  ([employer])   Insurance Comments/Visits Authorized auth after evaluation   Medical Diagnosis Neck Pain   Surgical/Medical history reviewed Yes   Precautions/Limitations no known precautions/limitations   Body Part(s)   Body Part(s) Cervical Spine   Presentation and Etiology   Pertinent history of current problem (include personal factors and/or comorbidities that impact the POC) Pt states her neck pain is there, didn't take any medications. pt states the pain goes down the right arm and into the top of the left shoulder. pt states the neck apin and the left hip pain started when she went dancing, swing dancing. pt states she does graceful swing dancing. 7/10 today in the neck.    Impairments A. Pain;C. Swelling;D. Decreased ROM;H. Impaired gait   Functional Limitations perform activities of daily living;perform desired leisure / sports activities   Symptom Location upper neck, lower neck, both shoulders, into the right arm.   How/Where did it occur During recreation/sports   Onset date of current episode/exacerbation 08/01/17   Chronicity New   Pain rating (0-10 point scale) Best (/10);Worst (/10)   Best (/10) 5   Worst (/10) 7   Pain quality C. Aching;E. Shooting;F. Stabbing   Frequency of pain/symptoms A. Constant   Pain/symptoms exacerbated by A. Sitting;B. Walking;G. Certain positions;K. Home tasks   Pain/symptoms eased by C. Rest;H. Cold   Current / Previous Interventions   Diagnostic Tests: MRI   MRI Results Results   MRI results foraminal stenosis C4-C5, C5-C6   Current Level of Function   Patient role/employment history  G. Disabled;F. Retired   Fall Risk Screen   Fall screen completed by PT   Per patient - Fall 2 or more times in past year? Yes   Per patient - Fall with injury in past year? No   Is patient a fall risk? No   Fall screen comments pt states neuropathy into both feet makes her prone to tripping, catches herself before she falls.   Cervical Spine   Integumentary  normal skin integrity   Cervical Flexion ROM 25*   Cervical Extension ROM 23*   Cervical Right Side Bending ROM 20*   Cervical Left Side Bending ROM 12*   Cervical Right Rotation ROM 32*, pain at end range of motion.    Cervical Left Rotation ROM 30* pain at end ROM, most painful   Shoulder AROM Screen 115* flexion B, increased pain in the right shoulder. 95* abduction.    Shoulder/Wrist/Hand Strength Comments shoulder flexion strength R 4-/5 Shoulder abduction strength R 4-/5 shoulder ER strength R 4-/5   Upper Trapezius Flexibility + tightness B   Levator Scapula Flexibility + tightness B   Scalene Flexibility + tightness B   Pectoralis Minor Flexibility + tightness R   Alar Ligament Test normal   Transverse Ligament Test normal   Cervical Distraction Test -   Hoang Impingement Test + in RUE   Cervical/Shoulder Special Tests Comments + empty can test in the RUE   Segmental Mobility-Cervical decreased mobility    Segmental Mobility-Thoracic decreased mobility   Palpation increased pain and tightness in B upper trap, erector spinae, levator scap and scalenes   Neurological Testing Comments neurological testing not performed due to lack of numbness/ shooting into the arms.   Planned Therapy Interventions   Planned Therapy Interventions ROM;strengthening;joint mobilization;manual therapy;IADL retraining;ADL retraining;balance training   Planned Modality Interventions   Planned Modality Interventions TENS;Traction;Ultrasound   Clinical Impression   Criteria for Skilled Therapeutic Interventions Met yes, treatment indicated   PT Diagnosis Decreased ROM and  Strength in the neck and shoulders secondary to multiple muscular strain and R supraspinatus strain   Influenced by the following impairments decreased ROM and decreased strength   Functional limitations due to impairments decreased participation with cooking, social activities and dancing   Clinical Presentation Stable/Uncomplicated   Clinical Presentation Rationale pt presents with stable R shoulder and neck pain secondary to acute injury. pt is motivated to participate, few comorbidities   Clinical Decision Making (Complexity) Low complexity   Therapy Frequency 2 times/Week   Predicted Duration of Therapy Intervention (days/wks) 8 weeks   Risk & Benefits of therapy have been explained Yes   Patient, Family & other staff in agreement with plan of care Yes   Education Assessment   Preferred Learning Style Pictures/video   Barriers to Learning No barriers   ORTHO GOALS   PT Ortho Eval Goals 1;2;3   Ortho Goal 1   Goal Identifier Cooking   Goal Description Pt will demosnstrate atleast 4/5 shoulder flexion strength in order to  objects from an overhead cabinet.   Target Date 10/17/17   Ortho Goal 2   Goal Identifier Exercise   Goal Description Pt will demonstrate increased neck ROM of 50* rotation in order to participate with swing dancing for exercise.   Target Date 10/17/17   Ortho Goal 3   Goal Identifier HEP   Goal Description Pt will demonstrate proper form and duration of HEP in order to encourage independence and self management of symptoms.   Target Date 10/17/17   Total Evaluation Time   Total Evaluation Time 25

## 2017-08-23 ASSESSMENT — ANXIETY QUESTIONNAIRES: GAD7 TOTAL SCORE: 0

## 2017-08-30 ENCOUNTER — HOSPITAL ENCOUNTER (OUTPATIENT)
Dept: PHYSICAL THERAPY | Facility: CLINIC | Age: 62
Setting detail: THERAPIES SERIES
End: 2017-08-30
Attending: EMERGENCY MEDICINE
Payer: COMMERCIAL

## 2017-08-30 PROCEDURE — 97140 MANUAL THERAPY 1/> REGIONS: CPT | Mod: GP | Performed by: PHYSICAL THERAPIST

## 2017-08-30 PROCEDURE — 97110 THERAPEUTIC EXERCISES: CPT | Mod: GP | Performed by: PHYSICAL THERAPIST

## 2017-08-30 PROCEDURE — 40000718 ZZHC STATISTIC PT DEPARTMENT ORTHO VISIT: Performed by: PHYSICAL THERAPIST

## 2017-09-01 ENCOUNTER — HOSPITAL ENCOUNTER (OUTPATIENT)
Dept: PHYSICAL THERAPY | Facility: CLINIC | Age: 62
Setting detail: THERAPIES SERIES
End: 2017-09-01
Attending: EMERGENCY MEDICINE
Payer: COMMERCIAL

## 2017-09-01 ENCOUNTER — MYC REFILL (OUTPATIENT)
Dept: FAMILY MEDICINE | Facility: CLINIC | Age: 62
End: 2017-09-01

## 2017-09-01 DIAGNOSIS — G89.4 CHRONIC PAIN SYNDROME: Primary | ICD-10-CM

## 2017-09-01 PROCEDURE — 40000718 ZZHC STATISTIC PT DEPARTMENT ORTHO VISIT: Performed by: PHYSICAL THERAPIST

## 2017-09-01 PROCEDURE — 97140 MANUAL THERAPY 1/> REGIONS: CPT | Mod: GP | Performed by: PHYSICAL THERAPIST

## 2017-09-01 PROCEDURE — 97110 THERAPEUTIC EXERCISES: CPT | Mod: GP | Performed by: PHYSICAL THERAPIST

## 2017-09-01 NOTE — TELEPHONE ENCOUNTER
Message from [facility]:  Original authorizing provider: Julio Pat MD    Prakash Golden would like a refill of the following medications:  acetaminophen-codeine (TYLENOL #3) 300-30 MG per tablet [Julio Pat MD]    Preferred pharmacy: [facility]    Comment:  Dr. Pat, At my last visit you said you would renew acetaminophen- codeine 300. I am out and was wondering if you could renew it [facility]. they never recieved the prescription.

## 2017-09-01 NOTE — TELEPHONE ENCOUNTER
Routing refill request to provider for review/approval because:  Drug not on the [facility] refill protocol     Thank you  Mirna CHACON RN

## 2017-09-05 ENCOUNTER — RADIANT APPOINTMENT (OUTPATIENT)
Dept: GENERAL RADIOLOGY | Facility: CLINIC | Age: 62
End: 2017-09-05
Attending: FAMILY MEDICINE
Payer: COMMERCIAL

## 2017-09-05 ENCOUNTER — OFFICE VISIT (OUTPATIENT)
Dept: FAMILY MEDICINE | Facility: CLINIC | Age: 62
End: 2017-09-05
Payer: COMMERCIAL

## 2017-09-05 VITALS
WEIGHT: 197 LBS | OXYGEN SATURATION: 96 % | SYSTOLIC BLOOD PRESSURE: 129 MMHG | DIASTOLIC BLOOD PRESSURE: 87 MMHG | HEART RATE: 82 BPM | TEMPERATURE: 98.3 F | BODY MASS INDEX: 30.85 KG/M2

## 2017-09-05 DIAGNOSIS — F32.0 MILD MAJOR DEPRESSION (H): Primary | ICD-10-CM

## 2017-09-05 DIAGNOSIS — M25.551 HIP PAIN, RIGHT: ICD-10-CM

## 2017-09-05 DIAGNOSIS — M25.511 CHRONIC RIGHT SHOULDER PAIN: ICD-10-CM

## 2017-09-05 DIAGNOSIS — G89.29 CHRONIC RIGHT SHOULDER PAIN: ICD-10-CM

## 2017-09-05 DIAGNOSIS — M79.641 PAIN OF RIGHT HAND: ICD-10-CM

## 2017-09-05 PROCEDURE — 73130 X-RAY EXAM OF HAND: CPT | Mod: RT

## 2017-09-05 PROCEDURE — 73502 X-RAY EXAM HIP UNI 2-3 VIEWS: CPT

## 2017-09-05 PROCEDURE — 99214 OFFICE O/P EST MOD 30 MIN: CPT | Performed by: FAMILY MEDICINE

## 2017-09-05 RX ORDER — HYDROCODONE BITARTRATE AND ACETAMINOPHEN 5; 325 MG/1; MG/1
1-2 TABLET ORAL 3 TIMES DAILY PRN
Qty: 40 TABLET | Refills: 0 | Status: SHIPPED | OUTPATIENT
Start: 2017-09-05 | End: 2018-05-16

## 2017-09-05 ASSESSMENT — PAIN SCALES - GENERAL: PAINLEVEL: SEVERE PAIN (7)

## 2017-09-05 NOTE — NURSING NOTE
"Chief Complaint   Patient presents with     ED follow up     8/17/17 Cervical radiculopathy has got better     Pain     right shoulder pain, left hip pain, right hand pain pt smashed hand in the window       Initial /87 (BP Location: Right arm, Patient Position: Chair, Cuff Size: Adult Large)  Pulse 82  Temp 98.3  F (36.8  C) (Tympanic)  Wt 197 lb (89.4 kg)  SpO2 96%  Breastfeeding? No  BMI 30.85 kg/m2 Estimated body mass index is 30.85 kg/(m^2) as calculated from the following:    Height as of 3/28/17: 5' 7\" (1.702 m).    Weight as of this encounter: 197 lb (89.4 kg).  Medication Reconciliation: complete   Swati Graham CMA       "

## 2017-09-05 NOTE — MR AVS SNAPSHOT
After Visit Summary   9/5/2017    Prakash Golden    MRN: [id number]           Patient Information     Date Of Birth          1955        Visit Information        Provider Department      9/5/2017 12:40 PM Julio Pat MD [facility]        Today's Diagnoses     Mild major depression (H)    -  1    Pain of right hand        Hip pain, right        Chronic right shoulder pain           Follow-ups after your visit        Additional Services     ORTHO  REFERRAL       [facility] is referring you to the Orthopedic  Services at [facility].       [facility]    Left hip pain, right shoulder pain  The  Representative will assist you in the coordination of your Orthopedic and Musculoskeletal Care as prescribed by your physician.    The  Representative will call you within 1 business day to help schedule your appointment, or you may contact the  Representative at:    All areas ~ [phone]     Type of Referral : Non Surgical       Timeframe requested: Routine    Coverage of these services is subject to the terms and limitations of your health insurance plan.  Please call member services at your health plan with any benefit or coverage questions.      If X-rays, CT or MRI's have been performed, please contact the facility where they were done to arrange for , prior to your scheduled appointment.  Please bring this referral request to your appointment and present it to your specialist.                  Your next 10 appointments already scheduled     Nov 30, 2017  8:30 AM CST   New Visit with Lavell Burton MD   [facility]    [address]  [address]  [place] [facility] [address] [phone]              Future tests that were ordered for you today     Open Future Orders        Priority Expected Expires  Ordered    MR Shoulder Right w/o Contrast Routine  9/5/2018 9/5/2017            Who to contact     If you have questions or need follow up information about today's clinic visit or your schedule please contact [facility] directly at [phone].  Normal or non-critical lab and imaging results will be communicated to you by MyChart, letter or phone within 4 business days after the clinic has received the results. If you do not hear from us within 7 days, please contact the clinic through [employer] or phone. If you have a critical or abnormal lab result, we will notify you by phone as soon as possible.  Submit refill requests through [facility] or call your pharmacy and they will forward the refill request to us. Please allow 3 business days for your refill to be completed.          Additional Information About Your Visit        MyChart Information     [employer] [facility] [employer] gives you secure access to your electronic health record. If you see a primary care provider, you can also send messages to your care team and make appointments. If you have questions, please call your primary care clinic.  If you do not have a primary care provider, please call [phone] and they will assist you.        Care EveryWhere ID     This is your Care EveryWhere ID. This could be used by other organizations to access your [facility] medical records  [id number]        Your Vitals Were     Pulse Temperature Pulse Oximetry Breastfeeding? BMI (Body Mass Index)       82 98.3  F (36.8  C) (Tympanic) 96% No 30.85 kg/m2        Blood Pressure from Last 3 Encounters:   09/05/17 129/87   08/17/17 167/82   06/21/17 132/75    Weight from Last 3 Encounters:   09/05/17 197 lb (89.4 kg)   06/10/17 204 lb (92.5 kg)   03/28/17 204 lb (92.5 kg)              We Performed the Following     DEPRESSION ACTION PLAN (DAP)     ORTHO [facility] REFERRAL        Primary Care Provider Office Phone # Fax #    Julio Pat [phone]        [address] [facility] [address]        Equal Access to Services     EDMARMARYLU ALEX : Hadii christian yu hadrayo Soelmerali, waaxda luqadaha, qaybta kasidrada lamontcristianosima, chico coley belenlakshmi cornellruthpako sharif. So [facility] [phone].    ATENCIÓN: Si habla español, tiene a evans disposición servicios gratuitos de asistencia lingüística. Llame al [phone].    We comply with applicable federal civil rights laws and [place] laws. We do not discriminate on the basis of race, color, national origin, age, disability sex, sexual orientation or gender identity.            Thank you!     Thank you for choosing [facility]  for your care. Our goal is always to provide you with excellent care. Hearing back from our patients is one way we can continue to improve our services. Please take a few minutes to complete the written survey that you may receive in the mail after your visit with us. Thank you!             Your Updated Medication List - Protect others around you: Learn how to safely use, store and throw away your medicines at www.disposemymeds.org.          This list is accurate as of: 9/5/17  1:33 PM.  Always use your most recent med list.                   Brand Name Dispense Instructions for use Diagnosis    acetaminophen-codeine 300-30 MG per tablet    TYLENOL #3    120 tablet    Take 1-2 tablets by mouth every 4 hours as needed    Chronic pain syndrome       citalopram 40 MG tablet    celeXA    90 tablet    Take 1 tablet (40 mg) by mouth daily    Major depressive disorder, recurrent episode, mild (H)       HYDROcodone-acetaminophen 5-325 MG per tablet    NORCO    10 tablet    Take 1-2 tablets by mouth every 6 hours as needed for moderate to severe pain        IBUPROFEN PO      Take 600 mg by mouth        meclizine 25 MG tablet    ANTIVERT    30 tablet    Take 1 tablet (25 mg) by mouth every 6 hours as needed for dizziness    Vertigo       OMEGA-3 FISH OIL PO      Take 2 capsules by mouth daily         ondansetron 4 MG ODT tab    ZOFRAN ODT    30 tablet    Take 1-2 tablets (4-8 mg) by mouth every 8 hours as needed for nausea    Nausea       SYNTHROID 112 MCG tablet   Generic drug:  levothyroxine     90 tablet    Take 1 tablet (112 mcg) by mouth daily    Hypothyroidism, unspecified type

## 2017-09-05 NOTE — PROGRESS NOTES
SUBJECTIVE:   Prakash Golden is a 62 year old female who presents to clinic today for the following health issues:    She went swelling dancing with her partner on July 3. This was very aggressive. Within 24 hours she noticed significant right shoulder pain and left hip pain. She has been in physical therapy for her right shoulder with no improvement.  Also last night she smashed her window on her right great metacarpal. She has significant pain at the base of the metacarpal. X-rays are negative. She was put in an Ace wrap.  We'll get MRI of right shoulder.  We'll get [facility] consult on the right shoulder and the left hip.  X-rays of the left hip are unremarkable.  Total face to face time: 25 minutes  Time spent counseling on 15 minutes as outline above   E D/UC Followup:    Facility:  [facility]  Date of visit: 8/17/17  Reason for visit: Cervical radiculopathy  Current Status: pt states is getting better      Pt is having tight side shoulder pain, left side hip pain has had since 7/3/17 pt is also having right hand pain pt smashed hand in a window last night 9/4/17.          Problem list and histories reviewed & adjusted, as indicated.  Additional history: as documented        Reviewed and updated as needed this visit by clinical staff  Tobacco  Allergies  Med Hx  Surg Hx  Fam Hx  Soc Hx      Reviewed and updated as needed this visit by Provider        Medical, surgical, family, social histories, allergies and meds reviewed and updated.    ROS:  General: No change in weight, sleep or appetite.  Normal energy.  No fever or chills  Musculoskeletal: Right hand pain, right shoulder pain, left hip pain.    Exam:  GENERAL APPEARANCE ADULT: Alert, no acute distress  MS: shoulder exam: appearance normal, range of motion: flexion moderately reduced, extension moderately reduced, abduction moderately reduced, external rotation moderately reduced  hand exam tenderness to palpation  on the proximal aspect of the right first MCP.  hip exam Hip appears normal, non-tender and normal range of motion    ASSESSMENT:  (F32.0) Mild major depression (H)  (primary encounter diagnosis)  Comment:   Plan: DEPRESSION ACTION PLAN (DAP)            (M79.641) Pain of right hand  Comment:   Plan: XR Hip Left 2-3 Views,         HYDROcodone-acetaminophen (NORCO) 5-325 MG per         tablet            (M25.551) Hip pain, right  Comment:   Plan: XR Hand Right G/E 3 Views, ORTHO          REFERRAL, HYDROcodone-acetaminophen (NORCO)         5-325 MG per tablet            (M25.511,  G89.29) Chronic right shoulder pain  Comment:   Plan: MR Shoulder Right w/o Contrast, ORTHO         REFERRAL              PLAN:  Orders Placed This Encounter     DEPRESSION ACTION PLAN (DAP)     XR Hip Left 2-3 Views     XR Hand Right G/E 3 Views     MR Shoulder Right w/o Contrast     ORTHO  REFERRAL     HYDROcodone-acetaminophen (NORCO) 5-325 MG per tablet   Recheck in clinic as needed.      There are no Patient Instructions on file for this visit.      Julio Pat

## 2017-09-13 ENCOUNTER — HOSPITAL ENCOUNTER (OUTPATIENT)
Dept: MRI IMAGING | Facility: CLINIC | Age: 62
Discharge: HOME | End: 2017-09-13
Attending: FAMILY MEDICINE | Admitting: FAMILY MEDICINE
Payer: COMMERCIAL

## 2017-09-13 DIAGNOSIS — G89.29 CHRONIC RIGHT SHOULDER PAIN: ICD-10-CM

## 2017-09-13 DIAGNOSIS — M25.511 CHRONIC RIGHT SHOULDER PAIN: ICD-10-CM

## 2017-09-13 PROCEDURE — 73221 MRI JOINT UPR EXTREM W/O DYE: CPT | Mod: RT

## 2017-09-14 DIAGNOSIS — M75.100 ROTATOR CUFF TEAR: Primary | ICD-10-CM

## 2017-09-20 ENCOUNTER — TRANSFERRED RECORDS (OUTPATIENT)
Dept: HEALTH INFORMATION MANAGEMENT | Facility: CLINIC | Age: 62
End: 2017-09-20

## 2017-09-21 ENCOUNTER — MYC MEDICAL ADVICE (OUTPATIENT)
Dept: FAMILY MEDICINE | Facility: CLINIC | Age: 62
End: 2017-09-21

## 2017-09-27 NOTE — TELEPHONE ENCOUNTER
Spoke with pt in depth, consulted with Ortho  and with our referral specialist.  Pt went to [facility] b/c she contacted her Insurance([employer]) and they said she could.  Pt will check with [facility] if the problem is they didn't get a PA for her injection?  Pt agreed to go forward with her Insurance questions/[employer].  Ortho referral were in place, pt did see [facility].  If pt needs a new provider she can go to [facility], [phone].  KPavelRN

## 2017-09-27 NOTE — TELEPHONE ENCOUNTER
Please call Orthopedic  and find out why she was sent to an Orhopedic out of network in [place]. Please help her schedule her follow up appointment with non surgical [facility] at [place]. Please have [facility] fax me a copy of the ortho note.

## 2018-04-25 DIAGNOSIS — E03.9 HYPOTHYROIDISM, UNSPECIFIED TYPE: ICD-10-CM

## 2018-04-25 NOTE — TELEPHONE ENCOUNTER
"Requested Prescriptions   Pending Prescriptions Disp Refills     SYNTHROID 112 MCG tablet [Pharmacy Med Name: SYNTHROID 112 MCG Tablet]  Last Written Prescription Date:  03/28/2017  Last Fill Quantity: 90,  # refills: 3   Last office visit: 9/5/2017 with prescribing provider:  RODY Pat   Future Office Visit:     90 tablet 3     Sig: TAKE 1 TABLET EVERY DAY    Thyroid Protocol Failed    4/25/2018  3:34 PM       Failed - Normal TSH on file in past 12 months    Recent Labs   Lab Test  03/28/17   1018   TSH  0.87             Passed - Patient is 12 years or older       Passed - Recent (12 mo) or future (30 days) visit within the authorizing provider's specialty    Patient had office visit in the last 12 months or has a visit in the next 30 days with authorizing provider or within the authorizing provider's specialty.  See \"Patient Info\" tab in inbasket, or \"Choose Columns\" in Meds & Orders section of the refill encounter.           Passed - No active pregnancy on record    If patient is pregnant or has had a positive pregnancy test, please check TSH.         Passed - No positive pregnancy test in past 12 months    If patient is pregnant or has had a positive pregnancy test, please check TSH.          Daniel Ramirez RT (R)    "

## 2018-04-27 RX ORDER — LEVOTHYROXINE SODIUM 112 MCG
TABLET ORAL
Qty: 30 TABLET | Refills: 0 | Status: SHIPPED | OUTPATIENT
Start: 2018-04-27 | End: 2018-05-16

## 2018-04-27 NOTE — TELEPHONE ENCOUNTER
Medication is being filled for 1 time refill only due to:  Patient needs labs TSH. Patient needs to be seen because it has been more than one year since last visit. Reminder placed on pharmacy notes and my chart message sent to patient to remind.    ASAD Knox

## 2018-05-16 ENCOUNTER — OFFICE VISIT (OUTPATIENT)
Dept: FAMILY MEDICINE | Facility: CLINIC | Age: 63
End: 2018-05-16
Payer: COMMERCIAL

## 2018-05-16 VITALS
OXYGEN SATURATION: 97 % | WEIGHT: 193 LBS | BODY MASS INDEX: 31.02 KG/M2 | HEART RATE: 62 BPM | TEMPERATURE: 98.7 F | SYSTOLIC BLOOD PRESSURE: 132 MMHG | HEIGHT: 66 IN | DIASTOLIC BLOOD PRESSURE: 94 MMHG

## 2018-05-16 DIAGNOSIS — Z02.71 DISABILITY EXAMINATION: ICD-10-CM

## 2018-05-16 DIAGNOSIS — E55.9 VITAMIN D DEFICIENCY: ICD-10-CM

## 2018-05-16 DIAGNOSIS — M25.551 HIP PAIN, RIGHT: ICD-10-CM

## 2018-05-16 DIAGNOSIS — E03.9 HYPOTHYROIDISM, UNSPECIFIED TYPE: Primary | ICD-10-CM

## 2018-05-16 DIAGNOSIS — F33.0 MAJOR DEPRESSIVE DISORDER, RECURRENT EPISODE, MILD (H): ICD-10-CM

## 2018-05-16 DIAGNOSIS — E78.5 HYPERLIPIDEMIA LDL GOAL <130: ICD-10-CM

## 2018-05-16 LAB
T4 FREE SERPL-MCNC: 1.27 NG/DL (ref 0.76–1.46)
TSH SERPL DL<=0.005 MIU/L-ACNC: 0.46 MU/L (ref 0.4–4)

## 2018-05-16 PROCEDURE — 99214 OFFICE O/P EST MOD 30 MIN: CPT | Performed by: FAMILY MEDICINE

## 2018-05-16 PROCEDURE — 82306 VITAMIN D 25 HYDROXY: CPT | Performed by: FAMILY MEDICINE

## 2018-05-16 PROCEDURE — 84443 ASSAY THYROID STIM HORMONE: CPT | Performed by: FAMILY MEDICINE

## 2018-05-16 PROCEDURE — 36415 COLL VENOUS BLD VENIPUNCTURE: CPT | Performed by: FAMILY MEDICINE

## 2018-05-16 PROCEDURE — 84439 ASSAY OF FREE THYROXINE: CPT | Performed by: FAMILY MEDICINE

## 2018-05-16 RX ORDER — CITALOPRAM HYDROBROMIDE 40 MG/1
40 TABLET ORAL DAILY
Qty: 90 TABLET | Refills: 3 | Status: SHIPPED | OUTPATIENT
Start: 2018-05-16 | End: 2018-09-20

## 2018-05-16 RX ORDER — LEVOTHYROXINE SODIUM 112 MCG
TABLET ORAL
Qty: 90 TABLET | Refills: 3 | Status: SHIPPED | OUTPATIENT
Start: 2018-05-16 | End: 2018-05-23

## 2018-05-16 RX ORDER — HYDROCODONE BITARTRATE AND ACETAMINOPHEN 5; 325 MG/1; MG/1
1-2 TABLET ORAL 3 TIMES DAILY PRN
Qty: 40 TABLET | Refills: 0 | Status: SHIPPED | OUTPATIENT
Start: 2018-05-16 | End: 2018-08-13

## 2018-05-16 ASSESSMENT — ANXIETY QUESTIONNAIRES
1. FEELING NERVOUS, ANXIOUS, OR ON EDGE: NOT AT ALL
2. NOT BEING ABLE TO STOP OR CONTROL WORRYING: NOT AT ALL
GAD7 TOTAL SCORE: 2
7. FEELING AFRAID AS IF SOMETHING AWFUL MIGHT HAPPEN: NOT AT ALL
6. BECOMING EASILY ANNOYED OR IRRITABLE: NOT AT ALL
5. BEING SO RESTLESS THAT IT IS HARD TO SIT STILL: SEVERAL DAYS
IF YOU CHECKED OFF ANY PROBLEMS ON THIS QUESTIONNAIRE, HOW DIFFICULT HAVE THESE PROBLEMS MADE IT FOR YOU TO DO YOUR WORK, TAKE CARE OF THINGS AT HOME, OR GET ALONG WITH OTHER PEOPLE: NOT DIFFICULT AT ALL
3. WORRYING TOO MUCH ABOUT DIFFERENT THINGS: NOT AT ALL

## 2018-05-16 ASSESSMENT — PATIENT HEALTH QUESTIONNAIRE - PHQ9: 5. POOR APPETITE OR OVEREATING: SEVERAL DAYS

## 2018-05-16 NOTE — MR AVS SNAPSHOT
After Visit Summary   5/16/2018    Prakash Golden    MRN: [id number]           Patient Information     Date Of Birth          1955        Visit Information        Provider Department      5/16/2018 11:00 AM Travon Arguello MD [facility]        Today's Diagnoses     Hypothyroidism, unspecified type    -  1    Vitamin D deficiency        Major depressive disorder, recurrent episode, mild (H)        Hip pain, right        Disability examination          Care Instructions          Thank you for choosing [facility].  You may be receiving a survey in the mail from [employer] Velas[employer] regarding your visit today.  Please take a few minutes to complete and return the survey to let us know how we are doing.      If you have questions or concerns, please contact us via [facility] [employer] or you can contact your care team at [phone].    Our Clinic hours are:  Monday 6:40 am  to 7:00 pm  Tuesday -Friday 6:40 am to 5:00 pm    The [place] outpatient lab hours are:  Monday - Friday 6:10 am to 4:45 pm  Saturdays 7:00 am to 11:00 am  Appointments are required, call [phone]    If you have clinical questions after hours or would like to schedule an appointment,  call the clinic at [phone].    (E03.9) Hypothyroidism, unspecified type  (primary encounter diagnosis)  Comment:   Plan: TSH, T4 FREE, SYNTHROID 112 MCG tablet        We discussed the labs and she will do these today and annually. Monitor for the symptoms of high and low thyroid.     (E55.9) Vitamin D deficiency  Comment:   Plan: Vitamin D Deficiency        Do the lab today and we will call the results and recommendations.     (F33.0) Major depressive disorder, recurrent episode, mild (H)  Comment:   Plan: citalopram (CELEXA) 40 MG tablet        You are doing well and stay on the same dose of Celexa at 40 mg daily. Call if any side effects.   Use the non drug therapies. If doing well we will refill and recheck annually.     (M25.551)  "Hip pain, right  Comment:   Plan: HYDROcodone-acetaminophen (NORCO) 5-325 MG per         tablet        You have the written Rx for #40 of these. The last refill lasted for 7-8 months.     (Z02.71) Disability examination  Comment:   Plan: we reviewed the case and filled out the form and gave the original to the pt to send in.   This is for three years and recheck then if stable.             Follow-ups after your visit        Who to contact     If you have questions or need follow up information about today's clinic visit or your schedule please contact [facility] directly at [phone].  Normal or non-critical lab and imaging results will be communicated to you by [employer], letter or phone within 4 business days after the clinic has received the results. If you do not hear from us within 7 days, please contact the clinic through [employer] or phone. If you have a critical or abnormal lab result, we will notify you by phone as soon as possible.  Submit refill requests through [employer] or call your pharmacy and they will forward the refill request to us. Please allow 3 business days for your refill to be completed.          Additional Information About Your Visit        [employer] Information     [employer] gives you secure access to your electronic health record. If you see a primary care provider, you can also send messages to your care team and make appointments. If you have questions, please call your primary care clinic.  If you do not have a primary care provider, please call [phone] and they will assist you.        Care EveryWhere ID     This is your Care EveryWhere ID. This could be used by other organizations to access your [facility] medical records  [id number]        Your Vitals Were     Pulse Temperature Height Pulse Oximetry BMI (Body Mass Index)       62 98.7  F (37.1  C) (Tympanic) 5' 6\" (1.676 m) 97% 31.15 kg/m2        Blood Pressure from Last 3 Encounters:   05/16/18 141/78   09/05/17 129/87 "   08/17/17 167/82    Weight from Last 3 Encounters:   05/16/18 193 lb (87.5 kg)   09/05/17 197 lb (89.4 kg)   06/10/17 204 lb (92.5 kg)              We Performed the Following     T4 FREE     TSH     Vitamin D Deficiency          Today's Medication Changes          These changes are accurate as of 5/16/18 12:10 PM.  If you have any questions, ask your nurse or doctor.               These medicines have changed or have updated prescriptions.        Dose/Directions    SYNTHROID 112 MCG tablet   This may have changed:  See the new instructions.   Used for:  Hypothyroidism, unspecified type   Generic drug:  levothyroxine   Changed by:  Travon Arguello MD        TAKE 1 TABLET EVERY DAY   Quantity:  90 tablet   Refills:  3         Stop taking these medicines if you haven't already. Please contact your care team if you have questions.     acetaminophen-codeine 300-30 MG per tablet   Commonly known as:  TYLENOL #3   Stopped by:  Travon Arguello MD                Where to get your medicines      These medications were sent to [facility] - [facility] [address]  [address], [facility] [address]     Phone:  [phone]     citalopram 40 MG tablet    SYNTHROID 112 MCG tablet         Some of these will need a paper prescription and others can be bought over the counter.  Ask your nurse if you have questions.     Bring a paper prescription for each of these medications     HYDROcodone-acetaminophen 5-325 MG per tablet               Information about OPIOIDS     PRESCRIPTION OPIOIDS: WHAT YOU NEED TO KNOW   You have a prescription for an opioid (narcotic) pain medicine. Opioids can cause addiction. If you have a history of chemical dependency of any type, you are at a higher risk of becoming addicted to opioids. Only take this medicine after all other options have been tried. Take it for as short a time and as few doses as possible.     Do not:    Drive. If you drive while  taking these medicines, you could be arrested for driving under the influence (DUI).    Operate heavy machinery    Do any other dangerous activities while taking these medicines.     Drink any alcohol while taking these medicines.      Take with any other medicines that contain acetaminophen. Read all labels carefully. Look for the word  acetaminophen  or  Tylenol.  Ask your pharmacist if you have questions or are unsure.    Store your pills in a secure place, locked if possible. We will not replace any lost or stolen medicine. If you don t finish your medicine, please throw away (dispose) as directed by your pharmacist. The [place] Pollution Control Agency has more information about safe disposal: https://www.pca.[facility].[place].us/living-green/managing-unwanted-medications    All opioids tend to cause constipation. Drink plenty of water and eat foods that have a lot of fiber, such as fruits, vegetables, prune juice, apple juice and high-fiber cereal. Take a laxative (Miralax, milk of magnesia, Colace, Senna) if you don t move your bowels at least every other day.          Primary Care Provider Office Phone # Fax #    Travon Arguello [phone] [address] [facility] [address]        Equal Access to Services     DIANNA JOHNSON AH: Hadii christian Govea, waaxda lutimothyadaha, qaybta kaalmada adecristianoda, chico sharif. So [facility] [phone].    ATENCIÓN: Si habla español, tiene a evans disposición servicios gratuitos de asistencia lingüística. Kati al [phone].    We comply with applicable federal civil rights laws and [place] laws. We do not discriminate on the basis of race, color, national origin, age, disability, sex, sexual orientation, or gender identity.            Thank you!     Thank you for choosing [facility]  for your care. Our goal is always to provide you with excellent care. Hearing back from our patients is one way we can continue  to improve our services. Please take a few minutes to complete the written survey that you may receive in the mail after your visit with us. Thank you!             Your Updated Medication List - Protect others around you: Learn how to safely use, store and throw away your medicines at www.disposemymeds.org.          This list is accurate as of 5/16/18 12:10 PM.  Always use your most recent med list.                   Brand Name Dispense Instructions for use Diagnosis    citalopram 40 MG tablet    celeXA    90 tablet    Take 1 tablet (40 mg) by mouth daily    Major depressive disorder, recurrent episode, mild (H)       HYDROcodone-acetaminophen 5-325 MG per tablet    NORCO    40 tablet    Take 1-2 tablets by mouth 3 times daily as needed for moderate to severe pain    Hip pain, right       IBUPROFEN PO      Take 600 mg by mouth        meclizine 25 MG tablet    ANTIVERT    30 tablet    Take 1 tablet (25 mg) by mouth every 6 hours as needed for dizziness    Vertigo       OMEGA-3 FISH OIL PO      Take 2 capsules by mouth daily        ondansetron 4 MG ODT tab    ZOFRAN ODT    30 tablet    Take 1-2 tablets (4-8 mg) by mouth every 8 hours as needed for nausea    Nausea       SYNTHROID 112 MCG tablet   Generic drug:  levothyroxine     90 tablet    TAKE 1 TABLET EVERY DAY    Hypothyroidism, unspecified type

## 2018-05-16 NOTE — PROGRESS NOTES
SUBJECTIVE:   Prakash Golden is a 63 year old female who presents to clinic today for the following health issues:      ESTABLISH CARE:  Was seeing Dr. Pat and he is no longer available.  Has seen you in the past.    Medication Followup of Thyroid    Taking Medication as prescribed: yes    Side Effects:  None    Medication Helping Symptoms:  She states she is tired a lot.  Can be times when she will need to take a nap.  Can have times when she is up more at night.    Would like to have lab work done today to check her levels.       Medication Followup of Chronic Pain    Taking Medication as prescribed: She is not taking the medication daily.  Maybe about a couple of times per week, when it is more painful with walking.    Side Effects:  None    Medication Helping Symptoms:  To a certain extent.  Sometimes it will help and sometimes not as much.  The pain can get bad at night.    She will have to walk with her crutches or cane at times during the day if the pain gets worse.       Medication Followup of Depression    Taking Medication as prescribed: yes    Side Effects:  Can have the dry heaves in the morning.  Then she is fine the rest of the day.    Medication Helping Symptoms:  Yes, she is currently taking a half of a tablet of the 40 mg.    She tried going of the medication in the past and that was a mistake she states.    PHQ-9 (Pf[employer]) 8/22/2017   1.  Little interest or pleasure in doing things 0   2.  Feeling down, depressed, or hopeless 0   3.  Trouble falling or staying asleep, or sleeping too much 1   4.  Feeling tired or having little energy 0   5.  Poor appetite or overeating 0   6.  Feeling bad about yourself 0   7.  Trouble concentrating 0   8.  Moving slowly or restless 0   9.  Suicidal or self-harm thoughts 0   PHQ-9 Total Score 1   Difficulty at work, home, or with people Not difficult at all     SANKET-7   [employer], 2002; Used with Permission) 8/22/2017   1. Feeling nervous, anxious, or on  edge 0   2. Not being able to stop or control worrying 0   3. Worrying too much about different things 0   4. Trouble relaxing 0   5. Being so restless that it is hard to sit still 0   6. Becoming easily annoyed or irritable 0   7. Feeling afraid, as if something awful might happen 0   SANKET-7 Total Score 0   If you checked any problems, how difficult have they made it for you to do your work, take care of things at home, or get along with other people? Not difficult at all          LIPIDS:  She was taken off of the lipid medication for one year to see where her levels are at without the medication.      FORM:  Here to discuss about a form that needs to be completed every 3 years.  This is regarding her snf.      Current Outpatient Prescriptions:      citalopram (CELEXA) 40 MG tablet, Take 1 tablet (40 mg) by mouth daily, Disp: 90 tablet, Rfl: 3     HYDROcodone-acetaminophen (NORCO) 5-325 MG per tablet, Take 1-2 tablets by mouth 3 times daily as needed for moderate to severe pain, Disp: 40 tablet, Rfl: 0     IBUPROFEN PO, Take 600 mg by mouth, Disp: , Rfl:      meclizine (ANTIVERT) 25 MG tablet, Take 1 tablet (25 mg) by mouth every 6 hours as needed for dizziness, Disp: 30 tablet, Rfl: 6     Omega-3 Fatty Acids (OMEGA-3 FISH OIL PO), Take 2 capsules by mouth daily, Disp: , Rfl:      ondansetron (ZOFRAN ODT) 4 MG disintegrating tablet, Take 1-2 tablets (4-8 mg) by mouth every 8 hours as needed for nausea, Disp: 30 tablet, Rfl: 6     SYNTHROID 112 MCG tablet, TAKE 1 TABLET EVERY DAY, Disp: 90 tablet, Rfl: 3     [DISCONTINUED] citalopram (CELEXA) 40 MG tablet, Take 1 tablet (40 mg) by mouth daily, Disp: 90 tablet, Rfl: 3     [DISCONTINUED] SYNTHROID 112 MCG tablet, TAKE 1 TABLET EVERY DAY, Disp: 30 tablet, Rfl: 0  No current facility-administered medications for this visit.     Facility-Administered Medications Ordered in Other Visits:      lidocaine 1 % injection, , , PRN, Brown, Kitty Ga, APRN CRNA, 10  "mL at 01/09/16 1430    Patient Active Problem List   Diagnosis     Osteoarthritis     Foot pain     Intractable neuropathic pain secondary to bilateral plantar fasciitis     Plantar fasciitis     Vitamin D deficiency     GERD (gastroesophageal reflux disease)     HYPERLIPIDEMIA LDL GOAL <130     Health Care Home     Mild major depression (H)     Advanced directives, counseling/discussion     Melanocytic nevus     Neoplasm of uncertain behavior of skin     Angioma     COPD (chronic obstructive pulmonary disease) (H)     Primary thunderclap headache     Headache     Intractable headache     Panic attack     Hypothyroidism, unspecified type       Blood pressure (!) 132/94, pulse 62, temperature 98.7  F (37.1  C), temperature source Tympanic, height 5' 6\" (1.676 m), weight 193 lb (87.5 kg), SpO2 97 %, not currently breastfeeding.    Exam:  GENERAL APPEARANCE: healthy, alert and no distress  MS: decreased range of motion of the lower back.   PSYCH: mentation appears normal and affect normal/bright      (E03.9) Hypothyroidism, unspecified type  (primary encounter diagnosis)  Comment:   Plan: TSH, T4 FREE, SYNTHROID 112 MCG tablet        We discussed the labs and she will do these today and annually. Monitor for the symptoms of high and low thyroid.     (E55.9) Vitamin D deficiency  Comment:   Plan: Vitamin D Deficiency        Do the lab today and we will call the results and recommendations.     (F33.0) Major depressive disorder, recurrent episode, mild (H)  Comment:   Plan: citalopram (CELEXA) 40 MG tablet        You are doing well and stay on the same dose of Celexa at 40 mg daily. Call if any side effects.   Use the non drug therapies. If doing well we will refill and recheck annually.     (M25.551) Hip pain, right  Comment:   Plan: HYDROcodone-acetaminophen (NORCO) 5-325 MG per         tablet        You have the written Rx for #40 of these. The last refill lasted for 7-8 months.     (Z02.71) Disability " examination  Comment:   Plan: we reviewed the case and filled out the form and gave the original to the pt to send in.   This is for three years and recheck then if stable.       Travon Arguello

## 2018-05-16 NOTE — PATIENT INSTRUCTIONS
Thank you for choosing [facility].  You may be receiving a survey in the mail from [employer] Velas[employer] regarding your visit today.  Please take a few minutes to complete and return the survey to let us know how we are doing.      If you have questions or concerns, please contact us via [employer] or you can contact your care team at [phone].    Our Clinic hours are:  Monday 6:40 am  to 7:00 pm  Tuesday -Friday 6:40 am to 5:00 pm    The [place] outpatient lab hours are:  Monday - Friday 6:10 am to 4:45 pm  Saturdays 7:00 am to 11:00 am  Appointments are required, call [phone]    If you have clinical questions after hours or would like to schedule an appointment,  call the clinic at [phone].    (E03.9) Hypothyroidism, unspecified type  (primary encounter diagnosis)  Comment:   Plan: TSH, T4 FREE, SYNTHROID 112 MCG tablet        We discussed the labs and she will do these today and annually. Monitor for the symptoms of high and low thyroid.     (E55.9) Vitamin D deficiency  Comment:   Plan: Vitamin D Deficiency        Do the lab today and we will call the results and recommendations.     (F33.0) Major depressive disorder, recurrent episode, mild (H)  Comment:   Plan: citalopram (CELEXA) 40 MG tablet        You are doing well and stay on the same dose of Celexa at 40 mg daily. Call if any side effects.   Use the non drug therapies. If doing well we will refill and recheck annually.     (M25.551) Hip pain, right  Comment:   Plan: HYDROcodone-acetaminophen (NORCO) 5-325 MG per         tablet        You have the written Rx for #40 of these. The last refill lasted for 7-8 months.     (Z02.71) Disability examination  Comment:   Plan: we reviewed the case and filled out the form and gave the original to the pt to send in.   This is for three years and recheck then if stable.

## 2018-05-17 LAB — DEPRECATED CALCIDIOL+CALCIFEROL SERPL-MC: 34 UG/L (ref 20–75)

## 2018-05-17 ASSESSMENT — PATIENT HEALTH QUESTIONNAIRE - PHQ9: SUM OF ALL RESPONSES TO PHQ QUESTIONS 1-9: 5

## 2018-05-17 ASSESSMENT — ANXIETY QUESTIONNAIRES: GAD7 TOTAL SCORE: 2

## 2018-05-22 ENCOUNTER — TELEPHONE (OUTPATIENT)
Dept: FAMILY MEDICINE | Facility: CLINIC | Age: 63
End: 2018-05-22

## 2018-05-22 DIAGNOSIS — E03.9 HYPOTHYROIDISM, UNSPECIFIED TYPE: Primary | ICD-10-CM

## 2018-05-22 NOTE — TELEPHONE ENCOUNTER
Reason for Call:  Other prescription    Detailed comments: Patient is asking for generic synthroid as it will be must cheaper for her.    Phone Number Patient can be reached at: Home number on file [phone] (home)    Best Time: any    Can we leave a detailed message on this number? YES    Call taken on 5/22/2018 at 1:56 PM by Bertha Stern

## 2018-05-23 RX ORDER — LEVOTHYROXINE SODIUM 112 UG/1
112 TABLET ORAL DAILY
Qty: 90 TABLET | Refills: 3 | Status: SHIPPED | OUTPATIENT
Start: 2018-05-23 | End: 2018-09-20

## 2018-08-13 ENCOUNTER — APPOINTMENT (OUTPATIENT)
Dept: CT IMAGING | Facility: CLINIC | Age: 63
End: 2018-08-13
Attending: EMERGENCY MEDICINE
Payer: COMMERCIAL

## 2018-08-13 ENCOUNTER — APPOINTMENT (OUTPATIENT)
Dept: ULTRASOUND IMAGING | Facility: CLINIC | Age: 63
End: 2018-08-13
Attending: EMERGENCY MEDICINE
Payer: COMMERCIAL

## 2018-08-13 ENCOUNTER — HOSPITAL ENCOUNTER (EMERGENCY)
Facility: CLINIC | Age: 63
Discharge: HOME | End: 2018-08-13
Attending: EMERGENCY MEDICINE | Admitting: EMERGENCY MEDICINE
Payer: COMMERCIAL

## 2018-08-13 VITALS
WEIGHT: 180 LBS | OXYGEN SATURATION: 95 % | TEMPERATURE: 98.4 F | DIASTOLIC BLOOD PRESSURE: 67 MMHG | RESPIRATION RATE: 18 BRPM | HEIGHT: 66 IN | BODY MASS INDEX: 28.93 KG/M2 | SYSTOLIC BLOOD PRESSURE: 120 MMHG

## 2018-08-13 DIAGNOSIS — N10 ACUTE PYELONEPHRITIS: ICD-10-CM

## 2018-08-13 LAB
ALBUMIN SERPL-MCNC: 3.7 G/DL (ref 3.4–5)
ALBUMIN UR-MCNC: NEGATIVE MG/DL
ALP SERPL-CCNC: 62 U/L (ref 40–150)
ALT SERPL W P-5'-P-CCNC: 37 U/L (ref 0–50)
ANION GAP SERPL CALCULATED.3IONS-SCNC: 7 MMOL/L (ref 3–14)
APPEARANCE UR: ABNORMAL
AST SERPL W P-5'-P-CCNC: 19 U/L (ref 0–45)
BACTERIA #/AREA URNS HPF: ABNORMAL /HPF
BASOPHILS # BLD AUTO: 0 10E9/L (ref 0–0.2)
BASOPHILS NFR BLD AUTO: 0.3 %
BILIRUB SERPL-MCNC: 0.3 MG/DL (ref 0.2–1.3)
BILIRUB UR QL STRIP: NEGATIVE
BUN SERPL-MCNC: 22 MG/DL (ref 7–30)
CALCIUM SERPL-MCNC: 8.5 MG/DL (ref 8.5–10.1)
CHLORIDE SERPL-SCNC: 109 MMOL/L (ref 94–109)
CO2 SERPL-SCNC: 26 MMOL/L (ref 20–32)
COLOR UR AUTO: YELLOW
CREAT SERPL-MCNC: 0.82 MG/DL (ref 0.52–1.04)
DIFFERENTIAL METHOD BLD: NORMAL
EOSINOPHIL # BLD AUTO: 0.2 10E9/L (ref 0–0.7)
EOSINOPHIL NFR BLD AUTO: 2 %
ERYTHROCYTE [DISTWIDTH] IN BLOOD BY AUTOMATED COUNT: 12.6 % (ref 10–15)
GFR SERPL CREATININE-BSD FRML MDRD: 70 ML/MIN/1.7M2
GLUCOSE SERPL-MCNC: 98 MG/DL (ref 70–99)
GLUCOSE UR STRIP-MCNC: NEGATIVE MG/DL
HCT VFR BLD AUTO: 45.9 % (ref 35–47)
HGB BLD-MCNC: 15.2 G/DL (ref 11.7–15.7)
HGB UR QL STRIP: NEGATIVE
IMM GRANULOCYTES # BLD: 0 10E9/L (ref 0–0.4)
IMM GRANULOCYTES NFR BLD: 0.2 %
KETONES UR STRIP-MCNC: NEGATIVE MG/DL
LEUKOCYTE ESTERASE UR QL STRIP: ABNORMAL
LYMPHOCYTES # BLD AUTO: 4.5 10E9/L (ref 0.8–5.3)
LYMPHOCYTES NFR BLD AUTO: 42.8 %
MCH RBC QN AUTO: 32.4 PG (ref 26.5–33)
MCHC RBC AUTO-ENTMCNC: 33.1 G/DL (ref 31.5–36.5)
MCV RBC AUTO: 98 FL (ref 78–100)
MONOCYTES # BLD AUTO: 0.9 10E9/L (ref 0–1.3)
MONOCYTES NFR BLD AUTO: 8.5 %
MUCOUS THREADS #/AREA URNS LPF: PRESENT /LPF
NEUTROPHILS # BLD AUTO: 4.9 10E9/L (ref 1.6–8.3)
NEUTROPHILS NFR BLD AUTO: 46.2 %
NITRATE UR QL: NEGATIVE
NRBC # BLD AUTO: 0 10*3/UL
NRBC BLD AUTO-RTO: 0 /100
PH UR STRIP: 5 PH (ref 5–7)
PLATELET # BLD AUTO: 225 10E9/L (ref 150–450)
POTASSIUM SERPL-SCNC: 3.8 MMOL/L (ref 3.4–5.3)
PROT SERPL-MCNC: 7 G/DL (ref 6.8–8.8)
RBC # BLD AUTO: 4.69 10E12/L (ref 3.8–5.2)
RBC #/AREA URNS AUTO: 4 /HPF (ref 0–2)
SODIUM SERPL-SCNC: 142 MMOL/L (ref 133–144)
SOURCE: ABNORMAL
SP GR UR STRIP: 1.02 (ref 1–1.03)
SQUAMOUS #/AREA URNS AUTO: 12 /HPF (ref 0–1)
TRANS CELLS #/AREA URNS HPF: 2 /HPF (ref 0–1)
UROBILINOGEN UR STRIP-MCNC: 0 MG/DL (ref 0–2)
WBC # BLD AUTO: 10.5 10E9/L (ref 4–11)
WBC #/AREA URNS AUTO: >182 /HPF (ref 0–5)

## 2018-08-13 PROCEDURE — 93976 VASCULAR STUDY: CPT | Mod: XS

## 2018-08-13 PROCEDURE — 96374 THER/PROPH/DIAG INJ IV PUSH: CPT | Performed by: EMERGENCY MEDICINE

## 2018-08-13 PROCEDURE — 96375 TX/PRO/DX INJ NEW DRUG ADDON: CPT | Performed by: EMERGENCY MEDICINE

## 2018-08-13 PROCEDURE — 81001 URINALYSIS AUTO W/SCOPE: CPT | Performed by: EMERGENCY MEDICINE

## 2018-08-13 PROCEDURE — 80053 COMPREHEN METABOLIC PANEL: CPT | Performed by: EMERGENCY MEDICINE

## 2018-08-13 PROCEDURE — 99285 EMERGENCY DEPT VISIT HI MDM: CPT | Mod: Z6 | Performed by: EMERGENCY MEDICINE

## 2018-08-13 PROCEDURE — 25000128 H RX IP 250 OP 636: Performed by: EMERGENCY MEDICINE

## 2018-08-13 PROCEDURE — 99285 EMERGENCY DEPT VISIT HI MDM: CPT | Mod: 25 | Performed by: EMERGENCY MEDICINE

## 2018-08-13 PROCEDURE — 96361 HYDRATE IV INFUSION ADD-ON: CPT | Performed by: EMERGENCY MEDICINE

## 2018-08-13 PROCEDURE — 87086 URINE CULTURE/COLONY COUNT: CPT | Performed by: EMERGENCY MEDICINE

## 2018-08-13 PROCEDURE — 74176 CT ABD & PELVIS W/O CONTRAST: CPT

## 2018-08-13 PROCEDURE — 85025 COMPLETE CBC W/AUTO DIFF WBC: CPT | Performed by: EMERGENCY MEDICINE

## 2018-08-13 PROCEDURE — 25000132 ZZH RX MED GY IP 250 OP 250 PS 637: Performed by: EMERGENCY MEDICINE

## 2018-08-13 RX ORDER — KETOROLAC TROMETHAMINE 15 MG/ML
15 INJECTION, SOLUTION INTRAMUSCULAR; INTRAVENOUS ONCE
Status: COMPLETED | OUTPATIENT
Start: 2018-08-13 | End: 2018-08-13

## 2018-08-13 RX ORDER — ONDANSETRON 4 MG/1
4 TABLET, ORALLY DISINTEGRATING ORAL EVERY 8 HOURS PRN
Qty: 10 TABLET | Refills: 0 | Status: SHIPPED | OUTPATIENT
Start: 2018-08-13 | End: 2018-08-16

## 2018-08-13 RX ORDER — HYDROMORPHONE HYDROCHLORIDE 1 MG/ML
0.5 INJECTION, SOLUTION INTRAMUSCULAR; INTRAVENOUS; SUBCUTANEOUS
Status: DISCONTINUED | OUTPATIENT
Start: 2018-08-13 | End: 2018-08-13 | Stop reason: HOSPADM

## 2018-08-13 RX ORDER — SULFAMETHOXAZOLE/TRIMETHOPRIM 800-160 MG
1 TABLET ORAL 2 TIMES DAILY
Qty: 20 TABLET | Refills: 0 | Status: SHIPPED | OUTPATIENT
Start: 2018-08-13 | End: 2018-08-23

## 2018-08-13 RX ORDER — OXYCODONE HYDROCHLORIDE 5 MG/1
5 TABLET ORAL EVERY 6 HOURS PRN
Qty: 6 TABLET | Refills: 0 | Status: SHIPPED | OUTPATIENT
Start: 2018-08-13 | End: 2018-09-20

## 2018-08-13 RX ORDER — SULFAMETHOXAZOLE/TRIMETHOPRIM 800-160 MG
1 TABLET ORAL ONCE
Status: COMPLETED | OUTPATIENT
Start: 2018-08-13 | End: 2018-08-13

## 2018-08-13 RX ORDER — ONDANSETRON 2 MG/ML
4 INJECTION INTRAMUSCULAR; INTRAVENOUS ONCE
Status: COMPLETED | OUTPATIENT
Start: 2018-08-13 | End: 2018-08-13

## 2018-08-13 RX ADMIN — Medication 0.5 MG: at 02:12

## 2018-08-13 RX ADMIN — Medication 0.5 MG: at 02:33

## 2018-08-13 RX ADMIN — SODIUM CHLORIDE 1000 ML: 9 INJECTION, SOLUTION INTRAVENOUS at 01:29

## 2018-08-13 RX ADMIN — SULFAMETHOXAZOLE AND TRIMETHOPRIM 1 TABLET: 800; 160 TABLET ORAL at 04:36

## 2018-08-13 RX ADMIN — ONDANSETRON 4 MG: 2 INJECTION INTRAMUSCULAR; INTRAVENOUS at 01:29

## 2018-08-13 RX ADMIN — KETOROLAC TROMETHAMINE 15 MG: 15 INJECTION, SOLUTION INTRAMUSCULAR; INTRAVENOUS at 01:53

## 2018-08-13 ASSESSMENT — PAIN DESCRIPTION - DESCRIPTORS
DESCRIPTORS: ACHING

## 2018-08-13 NOTE — ED NOTES
PT is lying on gurney and has complaints of pain 10/10 at this time. All needs are beibg met and all comfort measures are being addressed, Awaiting US at this time. I will continue to assess and monitor PT.

## 2018-08-13 NOTE — DISCHARGE INSTRUCTIONS
Drink plenty fluids.  Take the antibiotics as prescribed.  Return to the emergency department if you have repeated vomiting, worsening pain, or other concerns.  Otherwise follow-up in clinic if not better in 4-5 days.    Use ibuprofen and acetaminophen for your symptoms.  Use oxycodone as needed for pain that is not controlled by the prior two medications.    Oxycodone is an addictive opioid medication, please only take it when the pain is more than can be controlled by acetaminophen or ibuprofen alone. It will also make you lightheaded, nauseated, and constipated.  Do not drive, operate heavy machinery, or take care of young children while taking this medication.     Repeated studies have shown that the longer you use opioid pain medications, the longer it is until you return to normal function. It is our recommendation that you taper off opioids as quickly as possible with the goal of returning to normal function or near normal function. Long term use of opioids quickly results in growing tolerance to the medication (less of the benefits) and increased dependence (more of the bad side effects).     Pain is very difficult to treat and we can very rarely take away pain completely. If you are having difficulty with pain over several weeks after an injury, you may need to start different medications and therapies (such as physical therapy, graded exercise, massage, and acupuncture). Please talk about this with your regular doctor.     If you are interested in seeking free, confidential treatment referral and information service for individuals and families facing mental health and/or substance use disorders please call [phone])

## 2018-08-13 NOTE — ED AVS SNAPSHOT
[facility]    [address] [address]    Phone:  [phone]    Fax:  [phone]                                       Prakash Golden   MRN: [id number]    Department:  [facility]   Date of Visit:  8/13/2018           Patient Information     Date Of Birth          1955        Your diagnoses for this visit were:     Acute pyelonephritis        You were seen by Daniel Ingram MD.        Discharge Instructions       Drink plenty fluids.  Take the antibiotics as prescribed.  Return to the emergency department if you have repeated vomiting, worsening pain, or other concerns.  Otherwise follow-up in clinic if not better in 4-5 days.    Use ibuprofen and acetaminophen for your symptoms.  Use oxycodone as needed for pain that is not controlled by the prior two medications.    Oxycodone is an addictive opioid medication, please only take it when the pain is more than can be controlled by acetaminophen or ibuprofen alone. It will also make you lightheaded, nauseated, and constipated.  Do not drive, operate heavy machinery, or take care of young children while taking this medication.     Repeated studies have shown that the longer you use opioid pain medications, the longer it is until you return to normal function. It is our recommendation that you taper off opioids as quickly as possible with the goal of returning to normal function or near normal function. Long term use of opioids quickly results in growing tolerance to the medication (less of the benefits) and increased dependence (more of the bad side effects).     Pain is very difficult to treat and we can very rarely take away pain completely. If you are having difficulty with pain over several weeks after an injury, you may need to start different medications and therapies (such as physical therapy, graded exercise, massage, and acupuncture). Please talk about this with your  regular doctor.     If you are interested in seeking free, confidential treatment referral and information service for individuals and families facing mental health and/or substance use disorders please call [phone])     24 Hour Appointment Hotline       To make an appointment at any [facility] clinic, call [phone] ([phone]). If you don't have a family doctor or clinic, we will help you find one. [facility] clinics are conveniently located to serve the needs of you and your family.             Review of your medicines      START taking        Dose / Directions Last dose taken    oxyCODONE IR 5 MG tablet   Commonly known as:  ROXICODONE   Dose:  5 mg   Quantity:  6 tablet        Take 1 tablet (5 mg) by mouth every 6 hours as needed for pain   Refills:  0        sulfamethoxazole-trimethoprim 800-160 MG per tablet   Commonly known as:  BACTRIM DS   Dose:  1 tablet   Quantity:  20 tablet        Take 1 tablet by mouth 2 times daily for 10 days   Refills:  0          CONTINUE these medicines which may have CHANGED, or have new prescriptions. If we are uncertain of the size of tablets/capsules you have at home, strength may be listed as something that might have changed.        Dose / Directions Last dose taken    ondansetron 4 MG ODT tab   Commonly known as:  ZOFRAN ODT   Dose:  4 mg   What changed:    - how much to take  - reasons to take this   Quantity:  10 tablet        Take 1 tablet (4 mg) by mouth every 8 hours as needed   Refills:  0          Our records show that you are taking the medicines listed below. If these are incorrect, please call your family doctor or clinic.        Dose / Directions Last dose taken    citalopram 40 MG tablet   Commonly known as:  celeXA   Dose:  40 mg   Quantity:  90 tablet        Take 1 tablet (40 mg) by mouth daily   Refills:  3        IBUPROFEN PO   Dose:  600 mg        Take 600 mg by mouth   Refills:  0        levothyroxine 112 MCG tablet   Commonly known as:   SYNTHROID/LEVOTHROID   Dose:  112 mcg   Quantity:  90 tablet        Take 1 tablet (112 mcg) by mouth daily   Refills:  3        meclizine 25 MG tablet   Commonly known as:  ANTIVERT   Dose:  25 mg   Quantity:  30 tablet        Take 1 tablet (25 mg) by mouth every 6 hours as needed for dizziness   Refills:  6        OMEGA-3 FISH OIL PO   Dose:  2 capsule        Take 2 capsules by mouth daily   Refills:  0          STOP taking        Dose Reason for stopping Comments    HYDROcodone-acetaminophen 5-325 MG per tablet   Commonly known as:  NORCO                      Information about OPIOIDS     PRESCRIPTION OPIOIDS: WHAT YOU NEED TO KNOW   We gave you an opioid (narcotic) pain medicine. It is important to manage your pain, but opioids are not always the best choice. You should first try all the other options your care team gave you. Take this medicine for as short a time (and as few doses) as possible.    Some activities can increase your pain, such as bandage changes or therapy sessions. It may help to take your pain medicine 30 to 60 minutes before these activities. Reduce your stress by getting enough sleep, working on hobbies you enjoy and practicing relaxation or meditation. Talk to your care team about ways to manage your pain beyond prescription opioids.    These medicines have risks:    DO NOT drive when on new or higher doses of pain medicine. These medicines can affect your alertness and reaction times, and you could be arrested for driving under the influence (DUI). If you need to use opioids long-term, talk to your care team about driving.    DO NOT operate heavy machinery    DO NOT do any other dangerous activities while taking these medicines.    DO NOT drink any alcohol while taking these medicines.     If the opioid prescribed includes acetaminophen, DO NOT take with any other medicines that contain acetaminophen. Read all labels carefully. Look for the word  acetaminophen  or  Tylenol.  Ask your  pharmacist if you have questions or are unsure.    You can get addicted to pain medicines, especially if you have a history of addiction (chemical, alcohol or substance dependence). Talk to your care team about ways to reduce this risk.    All opioids tend to cause constipation. Drink plenty of water and eat foods that have a lot of fiber, such as fruits, vegetables, prune juice, apple juice and high-fiber cereal. Take a laxative (Miralax, milk of magnesia, Colace, Senna) if you don t move your bowels at least every other day. Other side effects include upset stomach, sleepiness, dizziness, throwing up, tolerance (needing more of the medicine to have the same effect), physical dependence and slowed breathing.    Store your pills in a secure place, locked if possible. We will not replace any lost or stolen medicine. If you don t finish your medicine, please throw away (dispose) as directed by your pharmacist. The [place] [employer]ution [employer] has more information about safe disposal: https://www.pca.[facility].[place].us/living-green/managing-unwanted-medications        Prescriptions were sent or printed at these locations (3 Prescriptions)                   [id number] - [place], [place] - [address]   [address] [facility] [address]    Telephone:  [phone]   Fax:  [phone]   Hours:                  E-Prescribed (2 of 3)         ondansetron (ZOFRAN ODT) 4 MG ODT tab               sulfamethoxazole-trimethoprim (BACTRIM DS) 800-160 MG per tablet                 Printed at Department/Unit printer (1 of 3)         oxyCODONE IR (ROXICODONE) 5 MG tablet                Procedures and tests performed during your visit     Abd/pelvis CT - no contrast - Stone Protocol    CBC with platelets differential    Comprehensive metabolic panel    Pelvic Ultrasound (US) with doppler imaging (r/o ovarian torsion)    UA reflex to Microscopic    Urine Culture Aerobic Bacterial      Orders Needing Specimen  Collection     None      Pending Results     Date and Time Order Name Status Description    8/13/2018 0133 Urine Culture Aerobic Bacterial Preliminary             Pending Culture Results     Date and Time Order Name Status Description    8/13/2018 0133 Urine Culture Aerobic Bacterial Preliminary             Pending Results Instructions     If you had any lab results that were not finalized at the time of your Discharge, you can call the ED Lab Result RN at [phone]. You will be contacted by this team for any positive Lab results or changes in treatment. The nurses are available 7 days a week from 10A to 6:30P.  You can leave a message 24 hours per day and they will return your call.        Test Results From Your Hospital Stay        8/13/2018  1:30 AM      Component Results     Component Value Ref Range & Units Status    Color Urine Yellow  Final    Appearance Urine Cloudy  Final    Glucose Urine Negative NEG^Negative mg/dL Final    Bilirubin Urine Negative NEG^Negative Final    Ketones Urine Negative NEG^Negative mg/dL Final    Specific Gravity Urine 1.025 1.003 - 1.035 Final    Blood Urine Negative NEG^Negative Final    pH Urine 5.0 5.0 - 7.0 pH Final    Protein Albumin Urine Negative NEG^Negative mg/dL Final    Urobilinogen mg/dL 0.0 0.0 - 2.0 mg/dL Final    Nitrite Urine Negative NEG^Negative Final    Leukocyte Esterase Urine Large (A) NEG^Negative Final    Source Midstream Urine  Final    RBC Urine 4 (H) 0 - 2 /HPF Final    WBC Urine >182 (H) 0 - 5 /HPF Final    Bacteria Urine Few (A) NEG^Negative /HPF Final    Squamous Epithelial /HPF Urine 12 (H) 0 - 1 /HPF Final    Transitional Epi 2 (H) 0 - 1 /HPF Final    Mucous Urine Present (A) NEG^Negative /LPF Final         8/13/2018  3:51 AM      Component Results     Component    Specimen Description    Midstream Urine    Special Requests    Specimen received in preservative    Culture Micro    PENDING         8/13/2018  2:11 AM      Component Results      Component Value Ref Range & Units Status    WBC 10.5 4.0 - 11.0 10e9/L Final    RBC Count 4.69 3.8 - 5.2 10e12/L Final    Hemoglobin 15.2 11.7 - 15.7 g/dL Final    Hematocrit 45.9 35.0 - 47.0 % Final    MCV 98 78 - 100 fl Final    MCH 32.4 26.5 - 33.0 pg Final    MCHC 33.1 31.5 - 36.5 g/dL Final    RDW 12.6 10.0 - 15.0 % Final    Platelet Count 225 150 - 450 10e9/L Final    Diff Method Automated Method  Final    % Neutrophils 46.2 % Final    % Lymphocytes 42.8 % Final    % Monocytes 8.5 % Final    % Eosinophils 2.0 % Final    % Basophils 0.3 % Final    % Immature Granulocytes 0.2 % Final    Nucleated RBCs 0 0 /100 Final    Absolute Neutrophil 4.9 1.6 - 8.3 10e9/L Final    Absolute Lymphocytes 4.5 0.8 - 5.3 10e9/L Final    Absolute Monocytes 0.9 0.0 - 1.3 10e9/L Final    Absolute Eosinophils 0.2 0.0 - 0.7 10e9/L Final    Absolute Basophils 0.0 0.0 - 0.2 10e9/L Final    Abs Immature Granulocytes 0.0 0 - 0.4 10e9/L Final    Absolute Nucleated RBC 0.0  Final         8/13/2018  2:18 AM      Component Results     Component Value Ref Range & Units Status    Sodium 142 133 - 144 mmol/L Final    Potassium 3.8 3.4 - 5.3 mmol/L Final    Chloride 109 94 - 109 mmol/L Final    Carbon Dioxide 26 20 - 32 mmol/L Final    Anion Gap 7 3 - 14 mmol/L Final    Glucose 98 70 - 99 mg/dL Final    Urea Nitrogen 22 7 - 30 mg/dL Final    Creatinine 0.82 0.52 - 1.04 mg/dL Final    GFR Estimate 70 >60 mL/min/1.7m2 Final    Non  GFR Calc    GFR Estimate If Black 85 >60 mL/min/1.7m2 Final    African American GFR Calc    Calcium 8.5 8.5 - 10.1 mg/dL Final    Bilirubin Total 0.3 0.2 - 1.3 mg/dL Final    Albumin 3.7 3.4 - 5.0 g/dL Final    Protein Total 7.0 6.8 - 8.8 g/dL Final    Alkaline Phosphatase 62 40 - 150 U/L Final    ALT 37 0 - 50 U/L Final    AST 19 0 - 45 U/L Final         8/13/2018  2:38 AM      Narrative     CT ABDOMEN AND PELVIS WITHOUT CONTRAST   8/13/2018 2:06 AM     HISTORY: Right flank pain.    COMPARISON:  None.    TECHNIQUE: 5/19/2013.    FINDINGS:     Right urinary tract: No renal or ureteral calculi. No dilatation of  the intrarenal collecting system or ureter.    Left urinary tract: No renal or ureteral calculi. No dilatation of the  intrarenal collecting system or ureter.    Urinary bladder: No visualized calculi.    Remainder of the abdomen and pelvis: Slight diffuse fatty infiltration  of the liver. The liver, spleen, pancreas and adrenal glands are  unremarkable to the limits of a noncontrast CT scan. The gallbladder  is present. The small and large bowel are normal in caliber. A few  colonic diverticula are present without evidence of diverticulitis.  The appendix is not visualized. No bowel wall thickening, pneumatosis  or free intraperitoneal gas. The uterus is present. No enlarged lymph  nodes or free fluid in the abdomen or pelvis.    Scan through the lower chest is unremarkable.        Impression     IMPRESSION:   1. No cause of acute pain identified in the abdomen or pelvis.  2. No renal or ureteral calculi or evidence of urinary obstruction.  3. A few colonic diverticula are present without evidence of  diverticulitis.    MEENA AMES MD         8/13/2018  3:51 AM      Narrative     ULTRASOUND PELVIS WITH TRANSVAGINAL IMAGING AND DOPPLER   8/13/2018  3:13 AM     HISTORY: Right adnexal pain radiating to the right flank.    COMPARISON: 8/13/2018 - CT chest performed the same day as this study.    TECHNIQUE: Endovaginal imaging was also performed to better evaluate  the ovaries. Spectral waveform and color Doppler evaluation were  performed of the ovaries.     FINDINGS: The uterus is retroflexed, measuring 6.4 x 2.8 x 4.5 cm in  long axis, short axis and transverse dimensions, respectively. No  myometrial masses. The endometrial stripe measures 0.5 cm in  thickness. The right and left ovaries are unremarkable, measuring 3.0  x 2.3 x 1.8 cm and 2.3 x 2.1 x 1.3 cm, respectively. Blood flow is  visualized  in both ovaries. No adnexal masses. No free fluid in the  pelvis.        Impression     IMPRESSION:   1. Unremarkable appearance of the uterus and ovaries. Blood flow is  visualized in both ovaries.  2. No free fluid in the pelvis.    MEENA AMES MD                Thank you for choosing [facility]       Thank you for choosing [facility] for your care. Our goal is always to provide you with excellent care. Hearing back from our patients is one way we can continue to improve our services. Please take a few minutes to complete the written survey that you may receive in the mail after you visit with us. Thank you!        [employer] Information     [employer] gives you secure access to your electronic health record. If you see a primary care provider, you can also send messages to your care team and make appointments. If you have questions, please call your primary care clinic.  If you do not have a primary care provider, please call [phone] and they will assist you.        Care EveryWhere ID     This is your Care EveryWhere ID. This could be used by other organizations to access your [facility] medical records  [id number]        Equal Access to Services     DIANNA JOHNSON : Hadii aad ku hadasho Soelmerali, waaxda luqadaha, qaybta kaalmada adesarahyasima, chico kumar . So [facility] [phone].    ATENCIÓN: Si habla español, tiene a evans disposición servicios gratuitos de asistencia lingüística. Llame al [phone].    We comply with applicable federal civil rights laws and [place] laws. We do not discriminate on the basis of race, color, national origin, age, disability, sex, sexual orientation, or gender identity.            After Visit Summary       This is your record. Keep this with you and show to your community pharmacist(s) and doctor(s) at your next visit.

## 2018-08-13 NOTE — ED NOTES
Onset of right flank pain since yesterday afternoon. Pain is sharp. Admits to nausea. No diarrhea or vomiting. No Hx of kidney stones.

## 2018-08-13 NOTE — ED AVS SNAPSHOT
[facility]    [address] [facility] [address]    Phone:  [phone]    Fax:  [phone]                                       Prakash Golden   MRN: [id number]    Department:  [facility]   Date of Visit:  8/13/2018           After Visit Summary Signature Page     I have received my discharge instructions, and my questions have been answered. I have discussed any challenges I see with this plan with the nurse or doctor.    ..........................................................................................................................................  Patient/Patient Representative Signature      ..........................................................................................................................................  Patient Representative Print Name and Relationship to Patient    ..................................................               ................................................  Date                                            Time    ..........................................................................................................................................  Reviewed by Signature/Title    ...................................................              ..............................................  Date                                                            Time

## 2018-08-14 LAB
BACTERIA SPEC CULT: NORMAL
Lab: NORMAL
SPECIMEN SOURCE: NORMAL

## 2018-09-20 ENCOUNTER — OFFICE VISIT (OUTPATIENT)
Dept: FAMILY MEDICINE | Facility: CLINIC | Age: 63
End: 2018-09-20
Payer: COMMERCIAL

## 2018-09-20 VITALS
WEIGHT: 187 LBS | DIASTOLIC BLOOD PRESSURE: 78 MMHG | HEART RATE: 70 BPM | OXYGEN SATURATION: 96 % | SYSTOLIC BLOOD PRESSURE: 130 MMHG | RESPIRATION RATE: 16 BRPM | TEMPERATURE: 98.2 F | BODY MASS INDEX: 30.05 KG/M2 | HEIGHT: 66 IN

## 2018-09-20 DIAGNOSIS — E03.9 HYPOTHYROIDISM, UNSPECIFIED TYPE: ICD-10-CM

## 2018-09-20 DIAGNOSIS — F33.0 MAJOR DEPRESSIVE DISORDER, RECURRENT EPISODE, MILD (H): ICD-10-CM

## 2018-09-20 DIAGNOSIS — Z00.00 HEALTHCARE MAINTENANCE: Primary | ICD-10-CM

## 2018-09-20 LAB
T4 FREE SERPL-MCNC: 1.18 NG/DL (ref 0.76–1.46)
TSH SERPL DL<=0.005 MIU/L-ACNC: 0.89 MU/L (ref 0.4–4)

## 2018-09-20 PROCEDURE — 84443 ASSAY THYROID STIM HORMONE: CPT | Performed by: FAMILY MEDICINE

## 2018-09-20 PROCEDURE — 36415 COLL VENOUS BLD VENIPUNCTURE: CPT | Performed by: FAMILY MEDICINE

## 2018-09-20 PROCEDURE — 99214 OFFICE O/P EST MOD 30 MIN: CPT | Performed by: FAMILY MEDICINE

## 2018-09-20 PROCEDURE — 84439 ASSAY OF FREE THYROXINE: CPT | Performed by: FAMILY MEDICINE

## 2018-09-20 RX ORDER — LEVOTHYROXINE SODIUM 112 UG/1
112 TABLET ORAL DAILY
Qty: 90 TABLET | Refills: 3 | Status: SHIPPED | OUTPATIENT
Start: 2018-09-20 | End: 2019-05-09

## 2018-09-20 RX ORDER — CITALOPRAM HYDROBROMIDE 40 MG/1
40 TABLET ORAL DAILY
Qty: 90 TABLET | Refills: 3 | Status: SHIPPED | OUTPATIENT
Start: 2018-09-20 | End: 2020-01-09

## 2018-09-20 ASSESSMENT — ANXIETY QUESTIONNAIRES
2. NOT BEING ABLE TO STOP OR CONTROL WORRYING: NOT AT ALL
5. BEING SO RESTLESS THAT IT IS HARD TO SIT STILL: NOT AT ALL
7. FEELING AFRAID AS IF SOMETHING AWFUL MIGHT HAPPEN: NOT AT ALL
GAD7 TOTAL SCORE: 0
IF YOU CHECKED OFF ANY PROBLEMS ON THIS QUESTIONNAIRE, HOW DIFFICULT HAVE THESE PROBLEMS MADE IT FOR YOU TO DO YOUR WORK, TAKE CARE OF THINGS AT HOME, OR GET ALONG WITH OTHER PEOPLE: NOT DIFFICULT AT ALL
1. FEELING NERVOUS, ANXIOUS, OR ON EDGE: NOT AT ALL
6. BECOMING EASILY ANNOYED OR IRRITABLE: NOT AT ALL
3. WORRYING TOO MUCH ABOUT DIFFERENT THINGS: NOT AT ALL

## 2018-09-20 ASSESSMENT — PATIENT HEALTH QUESTIONNAIRE - PHQ9: 5. POOR APPETITE OR OVEREATING: NOT AT ALL

## 2018-09-20 NOTE — PROGRESS NOTES
SUBJECTIVE:   Prakash Golden is a 63 year old female who presents to clinic today for the following health issues:      Medication Followup of Thyroid    Taking Medication as prescribed: yes    Side Effects:  None    Medication Helping Symptoms:  Yes, she has not noticed any changes.  She would like to have her labs checked again as she just switched over a few months ago.     She will be leaving for [place] for 6 months, leaving at the end of October.     Medication Followup of Vertigo     Taking Medication as prescribed: Only takes as needed.  Seems to have symptoms about twice per year.    Side Effects:  None    Medication Helping Symptoms:  Yes, she will take as needed and to to bed.  She would like to have this on hand when traveling to [place].     LIPIDS:  Discuss if a lipid panel needs to be checked.  She is not fasting today.        Current Outpatient Prescriptions:      citalopram (CELEXA) 40 MG tablet, Take 1 tablet (40 mg) by mouth daily, Disp: 90 tablet, Rfl: 3     IBUPROFEN PO, Take 600 mg by mouth, Disp: , Rfl:      levothyroxine (SYNTHROID/LEVOTHROID) 112 MCG tablet, Take 1 tablet (112 mcg) by mouth daily, Disp: 90 tablet, Rfl: 3     meclizine (ANTIVERT) 25 MG tablet, Take 1 tablet (25 mg) by mouth every 6 hours as needed for dizziness, Disp: 30 tablet, Rfl: 6     Omega-3 Fatty Acids (OMEGA-3 FISH OIL PO), Take 2 capsules by mouth daily, Disp: , Rfl:      [DISCONTINUED] citalopram (CELEXA) 40 MG tablet, Take 1 tablet (40 mg) by mouth daily, Disp: 90 tablet, Rfl: 3     [DISCONTINUED] levothyroxine (SYNTHROID/LEVOTHROID) 112 MCG tablet, Take 1 tablet (112 mcg) by mouth daily, Disp: 90 tablet, Rfl: 3  No current facility-administered medications for this visit.     Facility-Administered Medications Ordered in Other Visits:      lidocaine 1 % injection, , , PRN, Kitty Adan APRN CRNA, 10 mL at 01/09/16 1430    Patient Active Problem List   Diagnosis     Osteoarthritis     Foot pain      "Intractable neuropathic pain secondary to bilateral plantar fasciitis     Plantar fasciitis     Vitamin D deficiency     GERD (gastroesophageal reflux disease)     HYPERLIPIDEMIA LDL GOAL <130     Health Care Home     Mild major depression (H)     Advanced directives, counseling/discussion     Melanocytic nevus     Neoplasm of uncertain behavior of skin     Angioma     COPD (chronic obstructive pulmonary disease) (H)     Primary thunderclap headache     Headache     Intractable headache     Panic attack     Hypothyroidism, unspecified type       Blood pressure (!) 120/92, pulse 70, temperature 98.2  F (36.8  C), temperature source Tympanic, resp. rate 16, height 5' 6\" (1.676 m), weight 187 lb (84.8 kg), SpO2 96 %, not currently breastfeeding.    Exam:  GENERAL APPEARANCE: healthy, alert and no distress  EYES: EOMI,  PERRL  NECK: no adenopathy, no asymmetry, masses, or scars and thyroid normal to palpation  RESP: lungs clear to auscultation - no rales, rhonchi or wheezes  CV: regular rates and rhythm, normal S1 S2, no S3 or S4 and no murmur, click or rub -  SKIN: no suspicious lesions or rashes  PSYCH: mentation appears normal and affect normal/bright      (E03.9) Hypothyroidism, unspecified type  Comment:   Plan: levothyroxine (SYNTHROID/LEVOTHROID) 112 MCG         tablet, TSH, T4 FREE        Monitor for the symptoms of high and low thyroid. Do the labs today and we will call the results.   The med is refilled for one year and if doing well then recheck annually.     (F33.0) Major depressive disorder, recurrent episode, mild (H)  Comment:   Plan: citalopram (CELEXA) 40 MG tablet        Use the med and the non drug therapies daily. If doing well then refill and recheck annually.     (Z00.00) Healthcare maintenance    Comment:   Plan: *MA Screening Digital Bilateral        Call [phone] to schedule. This is done annually til 65.       Travon Arguello          "

## 2018-09-20 NOTE — PATIENT INSTRUCTIONS
Thank you for choosing [facility].  You may be receiving a survey in the mail from [employer] regarding your visit today.  Please take a few minutes to complete and return the survey to let us know how we are doing.      If you have questions or concerns, please contact us via [employer] or you can contact your care team at [phone].    Our Clinic hours are:  Monday 6:40 am  to 7:00 pm  Tuesday -Friday 6:40 am to 5:00 pm    The [place] outpatient lab hours are:  Monday - Friday 6:10 am to 4:45 pm  Saturdays 7:00 am to 11:00 am  Appointments are required, call [phone]    If you have clinical questions after hours or would like to schedule an appointment,  call the clinic at [phone].    (E03.9) Hypothyroidism, unspecified type  Comment:   Plan: levothyroxine (SYNTHROID/LEVOTHROID) 112 MCG         tablet, TSH, T4 FREE        Monitor for the symptoms of high and low thyroid. Do the labs today and we will call the results.   The med is refilled for one year and if doing well then recheck annually.     (F33.0) Major depressive disorder, recurrent episode, mild (H)  Comment:   Plan: citalopram (CELEXA) 40 MG tablet        Use the med and the non drug therapies daily. If doing well then refill and recheck annually.     (Z00.00) Healthcare maintenance    Comment:   Plan: *MA Screening Digital Bilateral        Call [phone] to schedule. This is done annually til 65.

## 2018-09-20 NOTE — MR AVS SNAPSHOT
After Visit Summary   9/20/2018    Prakash Golden    MRN: [id number]           Patient Information     Date Of Birth          1955        Visit Information        Provider Department      9/20/2018 10:00 AM Travon Arguello MD [facility]        Today's Diagnoses     Healthcare maintenance    -  1    Hypothyroidism, unspecified type        Major depressive disorder, recurrent episode, mild (H)          Care Instructions          Thank you for choosing [facility].  You may be receiving a survey in the mail from [facility] regarding your visit today.  Please take a few minutes to complete and return the survey to let us know how we are doing.      If you have questions or concerns, please contact us via [employer] or you can contact your care team at [phone].    Our Clinic hours are:  Monday 6:40 am  to 7:00 pm  Tuesday -Friday 6:40 am to 5:00 pm    The [place] outpatient lab hours are:  Monday - Friday 6:10 am to 4:45 pm  Saturdays 7:00 am to 11:00 am  Appointments are required, call [phone]    If you have clinical questions after hours or would like to schedule an appointment,  call the clinic at [phone].    (E03.9) Hypothyroidism, unspecified type  Comment:   Plan: levothyroxine (SYNTHROID/LEVOTHROID) 112 MCG         tablet, TSH, T4 FREE        Monitor for the symptoms of high and low thyroid. Do the labs today and we will call the results.   The med is refilled for one year and if doing well then recheck annually.     (F33.0) Major depressive disorder, recurrent episode, mild (H)  Comment:   Plan: citalopram (CELEXA) 40 MG tablet        Use the med and the non drug therapies daily. If doing well then refill and recheck annually.     (Z00.00) Healthcare maintenance    Comment:   Plan: *MA Screening Digital Bilateral        Call [phone] to schedule. This is done annually til 65.           Follow-ups after your visit        Future tests that were ordered for  "you today     Open Future Orders        Priority Expected Expires Ordered    *MA Screening Digital Bilateral Routine  9/20/2019 9/20/2018            Who to contact     If you have questions or need follow up information about today's clinic visit or your schedule please contact [facility] directly at [phone].  Normal or non-critical lab and imaging results will be communicated to you by MyChart, letter or phone within 4 business days after the clinic has received the results. If you do not hear from us within 7 days, please contact the clinic through [employer] or phone. If you have a critical or abnormal lab result, we will notify you by phone as soon as possible.  Submit refill requests through [employer] or call your pharmacy and they will forward the refill request to us. Please allow 3 business days for your refill to be completed.          Additional Information About Your Visit        MyChart Information     [employer] gives you secure access to your electronic health record. If you see a primary care provider, you can also send messages to your care team and make appointments. If you have questions, please call your primary care clinic.  If you do not have a primary care provider, please call [phone] and they will assist you.        Care EveryWhere ID     This is your Care EveryWhere ID. This could be used by other organizations to access your [place] medical records  [id number]        Your Vitals Were     Pulse Temperature Respirations Height Pulse Oximetry BMI (Body Mass Index)    70 98.2  F (36.8  C) (Tympanic) 16 5' 6\" (1.676 m) 96% 30.18 kg/m2       Blood Pressure from Last 3 Encounters:   09/20/18 (!) 120/92   08/13/18 120/67   05/16/18 (!) 132/94    Weight from Last 3 Encounters:   09/20/18 187 lb (84.8 kg)   08/13/18 180 lb (81.6 kg)   05/16/18 193 lb (87.5 kg)              We Performed the Following     T4 FREE     TSH          Today's Medication Changes          These changes are " accurate as of 9/20/18 10:40 AM.  If you have any questions, ask your nurse or doctor.               Stop taking these medicines if you haven't already. Please contact your care team if you have questions.     oxyCODONE IR 5 MG tablet   Commonly known as:  ROXICODONE   Stopped by:  Travon Arguello MD                Where to get your medicines      These medications were sent to [facility] - [place], [place] - [address]  [address], [facility] [address]     Phone:  [phone]     citalopram 40 MG tablet    levothyroxine 112 MCG tablet                Primary Care Provider Office Phone # Fax #    Travon Arguello [phone] [address] [facility] [address]        Equal Access to Services     DIANNA JOHNSON : Hadii christian yu hadasho Soomaali, waaxda luqadaha, qaybta kaalmada adeegyada, chico kumar . So [facility] [phone].    ATENCIÓN: Si habla español, tiene a evans disposición servicios gratuitos de asistencia lingüística. [facility] [phone].    We comply with applicable federal civil rights laws and [place] laws. We do not discriminate on the basis of race, color, national origin, age, disability, sex, sexual orientation, or gender identity.            Thank you!     Thank you for choosing [facility]  for your care. Our goal is always to provide you with excellent care. Hearing back from our patients is one way we can continue to improve our services. Please take a few minutes to complete the written survey that you may receive in the mail after your visit with us. Thank you!             Your Updated Medication List - Protect others around you: Learn how to safely use, store and throw away your medicines at www.disposemymeds.org.          This list is accurate as of 9/20/18 10:40 AM.  Always use your most recent med list.                   Brand Name Dispense Instructions for use Diagnosis    citalopram 40  MG tablet    celeXA    90 tablet    Take 1 tablet (40 mg) by mouth daily    Major depressive disorder, recurrent episode, mild (H)       IBUPROFEN PO      Take 600 mg by mouth        levothyroxine 112 MCG tablet    SYNTHROID/LEVOTHROID    90 tablet    Take 1 tablet (112 mcg) by mouth daily    Hypothyroidism, unspecified type       meclizine 25 MG tablet    ANTIVERT    30 tablet    Take 1 tablet (25 mg) by mouth every 6 hours as needed for dizziness    Vertigo       OMEGA-3 FISH OIL PO      Take 2 capsules by mouth daily

## 2018-09-21 ASSESSMENT — ANXIETY QUESTIONNAIRES: GAD7 TOTAL SCORE: 0

## 2018-09-21 ASSESSMENT — PATIENT HEALTH QUESTIONNAIRE - PHQ9: SUM OF ALL RESPONSES TO PHQ QUESTIONS 1-9: 3

## 2018-09-23 ENCOUNTER — MYC REFILL (OUTPATIENT)
Dept: OTOLARYNGOLOGY | Facility: CLINIC | Age: 63
End: 2018-09-23

## 2018-09-23 DIAGNOSIS — R42 VERTIGO: ICD-10-CM

## 2018-09-24 RX ORDER — MECLIZINE HYDROCHLORIDE 25 MG/1
25 TABLET ORAL EVERY 6 HOURS PRN
Qty: 30 TABLET | Refills: 6 | Status: SHIPPED | OUTPATIENT
Start: 2018-09-24

## 2018-09-24 NOTE — TELEPHONE ENCOUNTER
Pending Prescriptions:                       Disp   Refills    meclizine (ANTIVERT) 25 MG tablet         30 tab*6            Sig: Take 1 tablet (25 mg) by mouth every 6 hours as           needed for dizziness        Last Written Prescription Date: 2/4/2016   Last Fill Quantity: 30 tablets,   # refills: 6  Last Office Visit: 02/04/2016  Future Office visit:  None scheduled    Routing refill request to provider for review/approval because:  Patient has not been seen in 2 years.    Tacos Felix RN....9/24/2018 9:38 AM

## 2018-09-24 NOTE — TELEPHONE ENCOUNTER
Message from MyChart:  Original authorizing provider: Gerry Kitchen MD    Prakash SETHIDi Chantel would like a refill of the following medications:  meclizine (ANTIVERT) 25 MG tablet [Gerry Kitchen MD]    Preferred pharmacy: [facility] - [place], [place] - [address]    Comment:

## 2019-04-29 ENCOUNTER — TELEPHONE (OUTPATIENT)
Dept: FAMILY MEDICINE | Facility: CLINIC | Age: 64
End: 2019-04-29

## 2019-05-09 ENCOUNTER — OFFICE VISIT (OUTPATIENT)
Dept: FAMILY MEDICINE | Facility: CLINIC | Age: 64
End: 2019-05-09
Payer: COMMERCIAL

## 2019-05-09 ENCOUNTER — MYC MEDICAL ADVICE (OUTPATIENT)
Dept: FAMILY MEDICINE | Facility: CLINIC | Age: 64
End: 2019-05-09

## 2019-05-09 ENCOUNTER — HOSPITAL ENCOUNTER (OUTPATIENT)
Dept: MAMMOGRAPHY | Facility: CLINIC | Age: 64
Discharge: HOME | End: 2019-05-09
Attending: FAMILY MEDICINE | Admitting: FAMILY MEDICINE
Payer: COMMERCIAL

## 2019-05-09 ENCOUNTER — ANCILLARY PROCEDURE (OUTPATIENT)
Dept: GENERAL RADIOLOGY | Facility: CLINIC | Age: 64
End: 2019-05-09
Attending: FAMILY MEDICINE
Payer: COMMERCIAL

## 2019-05-09 VITALS
RESPIRATION RATE: 16 BRPM | HEIGHT: 66 IN | WEIGHT: 189 LBS | HEART RATE: 63 BPM | TEMPERATURE: 97.9 F | DIASTOLIC BLOOD PRESSURE: 78 MMHG | BODY MASS INDEX: 30.37 KG/M2 | OXYGEN SATURATION: 95 % | SYSTOLIC BLOOD PRESSURE: 134 MMHG

## 2019-05-09 DIAGNOSIS — M25.561 CHRONIC PAIN OF BOTH KNEES: Primary | ICD-10-CM

## 2019-05-09 DIAGNOSIS — E03.9 HYPOTHYROIDISM, UNSPECIFIED TYPE: ICD-10-CM

## 2019-05-09 DIAGNOSIS — Z12.39 BREAST CANCER SCREENING: ICD-10-CM

## 2019-05-09 DIAGNOSIS — G89.29 CHRONIC PAIN OF BOTH KNEES: ICD-10-CM

## 2019-05-09 DIAGNOSIS — M25.561 CHRONIC PAIN OF BOTH KNEES: ICD-10-CM

## 2019-05-09 DIAGNOSIS — E78.5 HYPERLIPIDEMIA LDL GOAL <130: ICD-10-CM

## 2019-05-09 DIAGNOSIS — G89.29 CHRONIC PAIN OF BOTH KNEES: Primary | ICD-10-CM

## 2019-05-09 DIAGNOSIS — M25.562 CHRONIC PAIN OF BOTH KNEES: Primary | ICD-10-CM

## 2019-05-09 DIAGNOSIS — M25.562 CHRONIC PAIN OF BOTH KNEES: ICD-10-CM

## 2019-05-09 LAB
CHOLEST SERPL-MCNC: 213 MG/DL
HDLC SERPL-MCNC: 40 MG/DL
LDLC SERPL CALC-MCNC: 145 MG/DL
NONHDLC SERPL-MCNC: 173 MG/DL
T4 FREE SERPL-MCNC: 1.26 NG/DL (ref 0.76–1.46)
TRIGL SERPL-MCNC: 141 MG/DL
TSH SERPL DL<=0.005 MIU/L-ACNC: 0.16 MU/L (ref 0.4–4)

## 2019-05-09 PROCEDURE — 80061 LIPID PANEL: CPT | Performed by: FAMILY MEDICINE

## 2019-05-09 PROCEDURE — 84443 ASSAY THYROID STIM HORMONE: CPT | Performed by: FAMILY MEDICINE

## 2019-05-09 PROCEDURE — 77063 BREAST TOMOSYNTHESIS BI: CPT

## 2019-05-09 PROCEDURE — 36415 COLL VENOUS BLD VENIPUNCTURE: CPT | Performed by: FAMILY MEDICINE

## 2019-05-09 PROCEDURE — 84439 ASSAY OF FREE THYROXINE: CPT | Performed by: FAMILY MEDICINE

## 2019-05-09 PROCEDURE — 99214 OFFICE O/P EST MOD 30 MIN: CPT | Performed by: FAMILY MEDICINE

## 2019-05-09 PROCEDURE — 73560 X-RAY EXAM OF KNEE 1 OR 2: CPT | Mod: LT

## 2019-05-09 RX ORDER — LEVOTHYROXINE SODIUM 112 UG/1
112 TABLET ORAL DAILY
Qty: 90 TABLET | Refills: 3 | Status: SHIPPED | OUTPATIENT
Start: 2019-05-09 | End: 2019-08-07

## 2019-05-09 ASSESSMENT — PATIENT HEALTH QUESTIONNAIRE - PHQ9
SUM OF ALL RESPONSES TO PHQ QUESTIONS 1-9: 0
5. POOR APPETITE OR OVEREATING: NOT AT ALL

## 2019-05-09 ASSESSMENT — ANXIETY QUESTIONNAIRES
3. WORRYING TOO MUCH ABOUT DIFFERENT THINGS: NOT AT ALL
7. FEELING AFRAID AS IF SOMETHING AWFUL MIGHT HAPPEN: NOT AT ALL
2. NOT BEING ABLE TO STOP OR CONTROL WORRYING: NOT AT ALL
1. FEELING NERVOUS, ANXIOUS, OR ON EDGE: NOT AT ALL
6. BECOMING EASILY ANNOYED OR IRRITABLE: NOT AT ALL
IF YOU CHECKED OFF ANY PROBLEMS ON THIS QUESTIONNAIRE, HOW DIFFICULT HAVE THESE PROBLEMS MADE IT FOR YOU TO DO YOUR WORK, TAKE CARE OF THINGS AT HOME, OR GET ALONG WITH OTHER PEOPLE: NOT DIFFICULT AT ALL
5. BEING SO RESTLESS THAT IT IS HARD TO SIT STILL: NOT AT ALL
GAD7 TOTAL SCORE: 0

## 2019-05-09 ASSESSMENT — MIFFLIN-ST. JEOR: SCORE: 1424.05

## 2019-05-09 NOTE — PROGRESS NOTES
SUBJECTIVE:   Prakash Golden is a 64 year old female who presents to clinic today for the following   health issues:      Musculoskeletal problem/pain      Duration: Years for pain, worse in the last year.    Description  Location: Bilateral knee pain.    Intensity:  moderate,     Accompanying signs and symptoms: None, no swelling or pain radiation.  Can feel like she has a lump on the knees at times.    History  Previous similar problem: YES- for 6 years.  Previous evaluation:  none    Precipitating or alleviating factors:  Trauma or overuse: no   Aggravating factors include: climbing stairs, walking, fell twice in the past month, one time was when she was in a trailer-the knees gave out.  Too much exercise or even standing can cause pain, after 15 minutes.     Therapies tried and outcome: Ibuprofen as needed-this does cause stomach issues for her.      THYROID MEDICATION:  She would like to have her thyroid levels checked as she changed to a generic medication.  Has had issues in the past with a generic medication.    DERM ISSUE:  Check of a skin area on the nose.  Has been there for one year.  This is getting larger.  No itching or bleeding.  She will usually cover this area with makeup.      LIPIDS:  She is fasting today to have her lipids checked.    MAMMOGRAM:  She would like to have a routine mammogram done.  Her sister was recently diagnosed with breast cancer.        Current Outpatient Medications:      citalopram (CELEXA) 40 MG tablet, Take 1 tablet (40 mg) by mouth daily, Disp: 90 tablet, Rfl: 3     IBUPROFEN PO, Take 600 mg by mouth, Disp: , Rfl:      levothyroxine (SYNTHROID/LEVOTHROID) 112 MCG tablet, Take 1 tablet (112 mcg) by mouth daily, Disp: 90 tablet, Rfl: 3     meclizine (ANTIVERT) 25 MG tablet, Take 1 tablet (25 mg) by mouth every 6 hours as needed for dizziness, Disp: 30 tablet, Rfl: 6     Omega-3 Fatty Acids (OMEGA-3 FISH OIL PO), Take 2 capsules by mouth daily, Disp: , Rfl:     Patient  "Active Problem List   Diagnosis     Osteoarthritis     Foot pain     Intractable neuropathic pain secondary to bilateral plantar fasciitis     Plantar fasciitis     Vitamin D deficiency     GERD (gastroesophageal reflux disease)     HYPERLIPIDEMIA LDL GOAL <130     Health Care Home     Mild major depression (H)     Advanced directives, counseling/discussion     Melanocytic nevus     Neoplasm of uncertain behavior of skin     Angioma     COPD (chronic obstructive pulmonary disease) (H)     Primary thunderclap headache     Headache     Intractable headache     Panic attack     Hypothyroidism, unspecified type       Blood pressure 134/78, pulse 63, temperature 97.9  F (36.6  C), temperature source Tympanic, resp. rate 16, height 1.676 m (5' 6\"), weight 85.7 kg (189 lb), SpO2 95 %, not currently breastfeeding.    Exam:  GENERAL APPEARANCE: healthy, alert and no distress  EYES: EOMI,  PERRL  NECK: no adenopathy, no asymmetry, masses, or scars and thyroid normal to palpation  CV: regular rates and rhythm, normal S1 S2, no S3 or S4 and no murmur, click or rub -  MS: tender to palpation bilaterally on the medial joint lines of the knees. The rest of the knees are not tender.   She cannot squat more than 20 degrees. Patellae compression was not tender.   Apley's test was normal bilaterally. There is no redness or effusion.   PSYCH: mentation appears normal and affect normal/bright    (M25.561,  M25.562,  G89.29) Chronic pain of both knees  (primary encounter diagnosis)  Comment:   Plan: do the bilateral knee x rays today, and we will call the results. Use ice for 5-10 minutes, three times daily for a few days, and avoid kneeling and squatting. Use Tylenol as needed, and Advil at   4000-600 mg per dose, with non spicy food, three times daily for 5-7 days. Stop if not better. If there is arthritis on the x rays and the other therapies are not effective, then recheck in the clinic for   Consideration of a cortisone shot. "     (E03.9) Hypothyroidism, unspecified type  Comment:   Plan: TSH, T4 FREE, levothyroxine         (SYNTHROID/LEVOTHROID) 112 MCG tablet        Do the two labs today, and we will call the results. If normal then stay on 112 mcg daily and this is refilled for one year.   Monitor for the symptoms of high and low thyroid.     (E78.5) Hyperlipidemia LDL goal <130  Comment:   Plan: Lipid panel reflex to direct LDL Fasting        Do the fasting labs and the goal is above. Stay on the lower chol diet and exercise daily.     (Z12.31) Breast cancer screening  Comment:   Plan: *MA Screening Digital Bilateral        Go to Diagnostics now or call [phone] and we will call the results.     Travon Arguello

## 2019-05-09 NOTE — PATIENT INSTRUCTIONS
Thank you for choosing [facility].  You may be receiving an email and/or telephone survey request from [facility] regarding your visit today.  Please take a few minutes to respond to the survey to let us know how we are doing.      If you have questions or concerns, please contact us via [employer] or you can contact your care team at [phone].    Our Clinic hours are:  Monday 6:40 am  to 7:00 pm  Tuesday -Friday 6:40 am to 5:00 pm    The [place] outpatient lab hours are:  Monday - Friday 6:10 am to 4:45 pm  Saturdays 7:00 am to 11:00 am  Appointments are required, call [phone]    If you have clinical questions after hours or would like to schedule an appointment,  call the clinic at [phone].    (M25.561,  M25.562,  G89.29) Chronic pain of both knees  (primary encounter diagnosis)  Comment:   Plan: do the bilateral knee x rays today, and we will call the results. Use ice for 5-10 minutes, three times daily for a few days, and avoid kneeling and squatting. Use Tylenol as needed, and Advil at   4000-600 mg per dose, with non spicy food, three times daily for 5-7 days. Stop if not better. If there is arthritis on the x rays and the other therapies are not effective, then recheck in the clinic for   Consideration of a cortisone shot.     (E03.9) Hypothyroidism, unspecified type  Comment:   Plan: TSH, T4 FREE, levothyroxine         (SYNTHROID/LEVOTHROID) 112 MCG tablet        Do the two labs today, and we will call the results. If normal then stay on 112 mcg daily and this is refilled for one year.   Monitor for the symptoms of high and low thyroid.     (E78.5) Hyperlipidemia LDL goal <130  Comment:   Plan: Lipid panel reflex to direct LDL Fasting        Do the fasting labs and the goal is above. Stay on the lower chol diet and exercise daily.     (Z12.31) Breast cancer screening  Comment:   Plan: *MA Screening Digital Bilateral        Go to Diagnostics now or call [phone] and  we will call the results.

## 2019-05-10 ENCOUNTER — MYC MEDICAL ADVICE (OUTPATIENT)
Dept: FAMILY MEDICINE | Facility: CLINIC | Age: 64
End: 2019-05-10

## 2019-05-10 ASSESSMENT — ANXIETY QUESTIONNAIRES: GAD7 TOTAL SCORE: 0

## 2019-05-14 ENCOUNTER — HOSPITAL ENCOUNTER (OUTPATIENT)
Dept: ULTRASOUND IMAGING | Facility: CLINIC | Age: 64
Discharge: HOME | End: 2019-05-14
Attending: FAMILY MEDICINE | Admitting: FAMILY MEDICINE
Payer: COMMERCIAL

## 2019-05-14 DIAGNOSIS — R92.8 ABNORMAL MAMMOGRAM: ICD-10-CM

## 2019-05-14 PROCEDURE — 76642 ULTRASOUND BREAST LIMITED: CPT | Mod: LT

## 2019-08-07 DIAGNOSIS — E03.9 HYPOTHYROIDISM, UNSPECIFIED TYPE: ICD-10-CM

## 2019-08-07 RX ORDER — LEVOTHYROXINE SODIUM 112 UG/1
TABLET ORAL
Qty: 90 TABLET | Refills: 2 | Status: SHIPPED | OUTPATIENT
Start: 2019-08-07 | End: 2020-07-14

## 2019-08-07 NOTE — TELEPHONE ENCOUNTER
S:  Refill request for levothyroxine 112mcg daily [facility]    B:  LOV 5/9/19  Levothyroxine 112mcg daily last written 5/9/19 for 12 month supply sent to [facility]    A:  This could be duplicate this could be [facility] to [facility]  Writer called patient:  Patient states she would like the prescription sent to [facility]      R:  Writer called [facility] and had them deactivate the prescription.  Writer sent prescription to [facility] for 9 month supply as 3 months have now passed since the original prescription was written.    No further action necessary.  Encounter closed.    Sloan Escobar RN

## 2019-08-07 NOTE — TELEPHONE ENCOUNTER
"Requested Prescriptions   Pending Prescriptions Disp Refills     levothyroxine (SYNTHROID/LEVOTHROID) 112 MCG tablet [Pharmacy Med Name: LEVOTHYROXINE SODIUM 112 MCG Tablet] 90 tablet 3     Sig: TAKE 1 TABLET EVERY DAY       Thyroid Protocol Failed - 8/7/2019 12:10 PM        Failed - Normal TSH on file in past 12 months     Recent Labs   Lab Test 05/09/19  1023   TSH 0.16*              Passed - Patient is 12 years or older        Passed - Recent (12 mo) or future (30 days) visit within the authorizing provider's specialty     Patient had office visit in the last 12 months or has a visit in the next 30 days with authorizing provider or within the authorizing provider's specialty.  See \"Patient Info\" tab in inbasket, or \"Choose Columns\" in Meds & Orders section of the refill encounter.              Passed - Medication is active on med list        Passed - No active pregnancy on record     If patient is pregnant or has had a positive pregnancy test, please check TSH.          Passed - No positive pregnancy test in past 12 months     If patient is pregnant or has had a positive pregnancy test, please check TSH.          Last Written Prescription Date:  5/9/19  Last Fill Quantity: 90,  # refills: 3   Last office visit: 5/9/2019 with prescribing provider:  Jos   Future Office Visit:      "

## 2019-09-30 ENCOUNTER — OFFICE VISIT (OUTPATIENT)
Dept: URGENT CARE | Facility: URGENT CARE | Age: 64
End: 2019-09-30
Payer: COMMERCIAL

## 2019-09-30 VITALS
DIASTOLIC BLOOD PRESSURE: 82 MMHG | OXYGEN SATURATION: 96 % | BODY MASS INDEX: 30.34 KG/M2 | RESPIRATION RATE: 22 BRPM | TEMPERATURE: 98.4 F | WEIGHT: 188 LBS | SYSTOLIC BLOOD PRESSURE: 137 MMHG | HEART RATE: 65 BPM

## 2019-09-30 DIAGNOSIS — H69.92 DYSFUNCTION OF LEFT EUSTACHIAN TUBE: Primary | ICD-10-CM

## 2019-09-30 PROCEDURE — 99213 OFFICE O/P EST LOW 20 MIN: CPT | Performed by: PHYSICIAN ASSISTANT

## 2019-09-30 RX ORDER — FLUTICASONE PROPIONATE 50 MCG
1 SPRAY, SUSPENSION (ML) NASAL 2 TIMES DAILY
Qty: 18.2 ML | Refills: 3 | Status: SHIPPED | OUTPATIENT
Start: 2019-09-30

## 2019-09-30 ASSESSMENT — ENCOUNTER SYMPTOMS
FEVER: 0
WHEEZING: 0
ABDOMINAL PAIN: 0
UNEXPECTED WEIGHT CHANGE: 0
MYALGIAS: 0
VOMITING: 0
FATIGUE: 0
SINUS PRESSURE: 0
SHORTNESS OF BREATH: 0
CHILLS: 0
NAUSEA: 0
DIARRHEA: 0
RHINORRHEA: 0
TROUBLE SWALLOWING: 0
ARTHRALGIAS: 0
COUGH: 0
PALPITATIONS: 0
SORE THROAT: 0
BACK PAIN: 0
EYE PAIN: 0
CHEST TIGHTNESS: 0
EYE REDNESS: 0

## 2019-10-01 NOTE — PROGRESS NOTES
SUBJECTIVE:   Prakash Golden is a 64 year old female presenting with a chief complaint of   Chief Complaint   Patient presents with     Ear Problem     left ear pain, throat pain on left side, fatigue, few days        She is an established patient of [facility].    URI Adult    Onset of symptoms was 2 day(s) ago.  Course of illness is same.    Severity moderate  Current and Associated symptoms: left ear pain, throat pain,   Treatment measures tried include Tylenol/Ibuprofen.  Predisposing factors include None.        Review of Systems   Constitutional: Negative for chills, fatigue, fever and unexpected weight change.   HENT: Positive for ear pain. Negative for congestion, postnasal drip, rhinorrhea, sinus pressure, sore throat and trouble swallowing.    Eyes: Negative for pain, redness and visual disturbance.   Respiratory: Negative for cough, chest tightness, shortness of breath and wheezing.    Cardiovascular: Negative for chest pain and palpitations.   Gastrointestinal: Negative for abdominal pain, diarrhea, nausea and vomiting.   Musculoskeletal: Negative for arthralgias, back pain and myalgias.   Skin: Negative for rash.       Past Medical History:   Diagnosis Date     Abnormal Pap smear of cervix ?    no details provided     Unspecified hypothyroidism      Family History   Problem Relation Age of Onset     Musculoskeletal Disorder Mother      Lipids Mother      Thyroid Disease Sister      Breast Cancer Sister 61        Double mastectomy.     Diabetes Son      Alcohol/Drug Son      Current Outpatient Medications   Medication Sig Dispense Refill     citalopram (CELEXA) 40 MG tablet Take 1 tablet (40 mg) by mouth daily 90 tablet 3     fluticasone (FLONASE) 50 MCG/ACT nasal spray Spray 1 spray into both nostrils 2 times daily 18.2 mL 3     IBUPROFEN PO Take 600 mg by mouth       levothyroxine (SYNTHROID/LEVOTHROID) 112 MCG tablet TAKE 1 TABLET EVERY DAY 90 tablet 2     meclizine (ANTIVERT) 25 MG tablet Take 1  tablet (25 mg) by mouth every 6 hours as needed for dizziness (Patient not taking: Reported on 2019) 30 tablet 6     Omega-3 Fatty Acids (OMEGA-3 FISH OIL PO) Take 2 capsules by mouth daily       Social History     Tobacco Use     Smoking status: Current Some Day Smoker     Packs/day: 0.40     Years: 35.00     Pack years: 14.00     Types: Cigarettes     Last attempt to quit: 11/15/2012     Years since quittin.8     Smokeless tobacco: Never Used     Tobacco comment: very little   Substance Use Topics     Alcohol use: Yes     Comment: rare       OBJECTIVE  /82   Pulse 65   Temp 98.4  F (36.9  C) (Tympanic)   Resp 22   Wt 85.3 kg (188 lb)   SpO2 96%   BMI 30.34 kg/m      Physical Exam  Constitutional:       Appearance: She is well-developed.   HENT:      Head: Normocephalic.      Right Ear: Tympanic membrane and ear canal normal.      Left Ear: Tympanic membrane and ear canal normal.   Eyes:      Conjunctiva/sclera: Conjunctivae normal.      Pupils: Pupils are equal, round, and reactive to light.   Cardiovascular:      Rate and Rhythm: Normal rate.      Heart sounds: Normal heart sounds.   Pulmonary:      Effort: Pulmonary effort is normal.      Breath sounds: Normal breath sounds.   Skin:     General: Skin is warm and dry.      Findings: No rash.   Psychiatric:         Behavior: Behavior normal.         Labs:  No results found for this or any previous visit (from the past 24 hour(s)).    X-Ray was not done.    ASSESSMENT:      ICD-10-CM    1. Dysfunction of left eustachian tube H69.82 fluticasone (FLONASE) 50 MCG/ACT nasal spray        Medical Decision Making:    Differential Diagnosis:  URI Adult/Peds:  Acute right otitis media, Acute left otitis media, Otitis externa and eustachian tube dysfunction     Serious Comorbid Conditions:  Adult:  None    PLAN:    Reassurance, no infection. Can start daily antihistamine and flonase two times daily. Return to clinic if symptoms worsen or do not improve;  otherwise follow up as needed      Followup:    If not improving or if condition worsens, follow up with your Primary Care Provider    There are no Patient Instructions on file for this visit.

## 2019-10-04 ENCOUNTER — OFFICE VISIT (OUTPATIENT)
Dept: FAMILY MEDICINE | Facility: CLINIC | Age: 64
End: 2019-10-04
Payer: COMMERCIAL

## 2019-10-04 ENCOUNTER — HEALTH MAINTENANCE LETTER (OUTPATIENT)
Age: 64
End: 2019-10-04

## 2019-10-04 VITALS
RESPIRATION RATE: 16 BRPM | HEIGHT: 66 IN | WEIGHT: 189 LBS | BODY MASS INDEX: 30.37 KG/M2 | HEART RATE: 60 BPM | OXYGEN SATURATION: 96 % | TEMPERATURE: 97.5 F | SYSTOLIC BLOOD PRESSURE: 128 MMHG | DIASTOLIC BLOOD PRESSURE: 86 MMHG

## 2019-10-04 DIAGNOSIS — R53.83 OTHER FATIGUE: Primary | ICD-10-CM

## 2019-10-04 DIAGNOSIS — E03.9 HYPOTHYROIDISM, UNSPECIFIED TYPE: ICD-10-CM

## 2019-10-04 LAB
ALBUMIN SERPL-MCNC: 3.6 G/DL (ref 3.4–5)
ALP SERPL-CCNC: 55 U/L (ref 40–150)
ALT SERPL W P-5'-P-CCNC: 31 U/L (ref 0–50)
ANION GAP SERPL CALCULATED.3IONS-SCNC: 4 MMOL/L (ref 3–14)
AST SERPL W P-5'-P-CCNC: 13 U/L (ref 0–45)
B BURGDOR IGG+IGM SER QL: 0.09 (ref 0–0.89)
BASOPHILS # BLD AUTO: 0 10E9/L (ref 0–0.2)
BASOPHILS NFR BLD AUTO: 0.3 %
BILIRUB SERPL-MCNC: 0.3 MG/DL (ref 0.2–1.3)
BUN SERPL-MCNC: 17 MG/DL (ref 7–30)
CALCIUM SERPL-MCNC: 8.7 MG/DL (ref 8.5–10.1)
CHLORIDE SERPL-SCNC: 107 MMOL/L (ref 94–109)
CHOLEST SERPL-MCNC: 223 MG/DL
CO2 SERPL-SCNC: 29 MMOL/L (ref 20–32)
CREAT SERPL-MCNC: 0.72 MG/DL (ref 0.52–1.04)
DIFFERENTIAL METHOD BLD: NORMAL
EOSINOPHIL # BLD AUTO: 0.2 10E9/L (ref 0–0.7)
EOSINOPHIL NFR BLD AUTO: 2.4 %
ERYTHROCYTE [DISTWIDTH] IN BLOOD BY AUTOMATED COUNT: 12.5 % (ref 10–15)
GFR SERPL CREATININE-BSD FRML MDRD: 87 ML/MIN/{1.73_M2}
GLUCOSE SERPL-MCNC: 85 MG/DL (ref 70–99)
HCT VFR BLD AUTO: 46.5 % (ref 35–47)
HDLC SERPL-MCNC: 48 MG/DL
HGB BLD-MCNC: 15.5 G/DL (ref 11.7–15.7)
LDLC SERPL CALC-MCNC: 145 MG/DL
LYMPHOCYTES # BLD AUTO: 2.8 10E9/L (ref 0.8–5.3)
LYMPHOCYTES NFR BLD AUTO: 35.4 %
MCH RBC QN AUTO: 32.4 PG (ref 26.5–33)
MCHC RBC AUTO-ENTMCNC: 33.3 G/DL (ref 31.5–36.5)
MCV RBC AUTO: 97 FL (ref 78–100)
MONOCYTES # BLD AUTO: 0.6 10E9/L (ref 0–1.3)
MONOCYTES NFR BLD AUTO: 7.9 %
NEUTROPHILS # BLD AUTO: 4.2 10E9/L (ref 1.6–8.3)
NEUTROPHILS NFR BLD AUTO: 54 %
NONHDLC SERPL-MCNC: 175 MG/DL
PLATELET # BLD AUTO: 216 10E9/L (ref 150–450)
POTASSIUM SERPL-SCNC: 4 MMOL/L (ref 3.4–5.3)
PROT SERPL-MCNC: 6.9 G/DL (ref 6.8–8.8)
RBC # BLD AUTO: 4.79 10E12/L (ref 3.8–5.2)
SODIUM SERPL-SCNC: 140 MMOL/L (ref 133–144)
T4 FREE SERPL-MCNC: 1 NG/DL (ref 0.76–1.46)
TRIGL SERPL-MCNC: 151 MG/DL
TSH SERPL DL<=0.005 MIU/L-ACNC: 0.51 MU/L (ref 0.4–4)
WBC # BLD AUTO: 7.8 10E9/L (ref 4–11)

## 2019-10-04 PROCEDURE — 84443 ASSAY THYROID STIM HORMONE: CPT | Performed by: FAMILY MEDICINE

## 2019-10-04 PROCEDURE — 85025 COMPLETE CBC W/AUTO DIFF WBC: CPT | Performed by: FAMILY MEDICINE

## 2019-10-04 PROCEDURE — 80053 COMPREHEN METABOLIC PANEL: CPT | Performed by: FAMILY MEDICINE

## 2019-10-04 PROCEDURE — 36415 COLL VENOUS BLD VENIPUNCTURE: CPT | Performed by: FAMILY MEDICINE

## 2019-10-04 PROCEDURE — 84439 ASSAY OF FREE THYROXINE: CPT | Performed by: FAMILY MEDICINE

## 2019-10-04 PROCEDURE — 99214 OFFICE O/P EST MOD 30 MIN: CPT | Performed by: FAMILY MEDICINE

## 2019-10-04 PROCEDURE — 80061 LIPID PANEL: CPT | Performed by: FAMILY MEDICINE

## 2019-10-04 PROCEDURE — 86618 LYME DISEASE ANTIBODY: CPT | Performed by: FAMILY MEDICINE

## 2019-10-04 ASSESSMENT — MIFFLIN-ST. JEOR: SCORE: 1424.05

## 2019-10-04 ASSESSMENT — ANXIETY QUESTIONNAIRES
2. NOT BEING ABLE TO STOP OR CONTROL WORRYING: NOT AT ALL
GAD7 TOTAL SCORE: 0
6. BECOMING EASILY ANNOYED OR IRRITABLE: NOT AT ALL
1. FEELING NERVOUS, ANXIOUS, OR ON EDGE: NOT AT ALL
5. BEING SO RESTLESS THAT IT IS HARD TO SIT STILL: NOT AT ALL
3. WORRYING TOO MUCH ABOUT DIFFERENT THINGS: NOT AT ALL
IF YOU CHECKED OFF ANY PROBLEMS ON THIS QUESTIONNAIRE, HOW DIFFICULT HAVE THESE PROBLEMS MADE IT FOR YOU TO DO YOUR WORK, TAKE CARE OF THINGS AT HOME, OR GET ALONG WITH OTHER PEOPLE: NOT DIFFICULT AT ALL
7. FEELING AFRAID AS IF SOMETHING AWFUL MIGHT HAPPEN: NOT AT ALL

## 2019-10-04 ASSESSMENT — PATIENT HEALTH QUESTIONNAIRE - PHQ9
SUM OF ALL RESPONSES TO PHQ QUESTIONS 1-9: 3
5. POOR APPETITE OR OVEREATING: NOT AT ALL

## 2019-10-04 NOTE — PROGRESS NOTES
Subjective     Prakash Golden is a 64 year old female who presents to clinic today for the following health issues:    HPI   Medication Followup of Thyroid    Taking Medication as prescribed: yes    Side Effects:  None    Medication Helping Symptoms:  She has been feeling more fatigue the past two weeks.  All she wants to do is lay down.  She feels weak all over and can get dizzy easily.      She would like to have her thyroid levels checked.      Would also like to have testing done for lyme disease.  [place]  She has neighbors who have been diagnosed with lyme disease this year.       Medication Followup of Depression    Taking Medication as prescribed: yes, she is taking 20 mg daily, not 40 mg.    Side Effects:  None    Medication Helping Symptoms:  Yes    Today the PHQ is 3 from only tiredness, and the SANKET is zero.     PHQ-9 ([employer]) 5/9/2019   1.  Little interest or pleasure in doing things 0   2.  Feeling down, depressed, or hopeless 0   3.  Trouble falling or staying asleep, or sleeping too much 0   4.  Feeling tired or having little energy 0   5.  Poor appetite or overeating 0   6.  Feeling bad about yourself 0   7.  Trouble concentrating 0   8.  Moving slowly or restless 0   9.  Suicidal or self-harm thoughts 0   PHQ-9 Total Score 0   Difficulty at work, home, or with people Not difficult at all     SANKET-7   [employer], 2002; Used with Permission) 5/9/2019   1. Feeling nervous, anxious, or on edge 0   2. Not being able to stop or control worrying 0   3. Worrying too much about different things 0   4. Trouble relaxing 0   5. Being so restless that it is hard to sit still 0   6. Becoming easily annoyed or irritable 0   7. Feeling afraid, as if something awful might happen 0   SANKET-7 Total Score 0   If you checked any problems, how difficult have they made it for you to do your work, take care of things at home, or get along with other people? Not difficult at all          LIPIDS:  She is  "fasting today to have labs done.  No current medication.  She will be leaving for [place] in 8 days for the Winter.      Current Outpatient Medications:      citalopram (CELEXA) 40 MG tablet, Take 1 tablet (40 mg) by mouth daily, Disp: 90 tablet, Rfl: 3     fluticasone (FLONASE) 50 MCG/ACT nasal spray, Spray 1 spray into both nostrils 2 times daily, Disp: 18.2 mL, Rfl: 3     IBUPROFEN PO, Take 600 mg by mouth, Disp: , Rfl:      levothyroxine (SYNTHROID/LEVOTHROID) 112 MCG tablet, TAKE 1 TABLET EVERY DAY, Disp: 90 tablet, Rfl: 2     meclizine (ANTIVERT) 25 MG tablet, Take 1 tablet (25 mg) by mouth every 6 hours as needed for dizziness, Disp: 30 tablet, Rfl: 6     Omega-3 Fatty Acids (OMEGA-3 FISH OIL PO), Take 2 capsules by mouth daily, Disp: , Rfl:     Patient Active Problem List   Diagnosis     Osteoarthritis     Foot pain     Intractable neuropathic pain secondary to bilateral plantar fasciitis     Plantar fasciitis     Vitamin D deficiency     GERD (gastroesophageal reflux disease)     HYPERLIPIDEMIA LDL GOAL <130     Health Care Home     Mild major depression (H)     Advanced directives, counseling/discussion     Melanocytic nevus     Neoplasm of uncertain behavior of skin     Angioma     COPD (chronic obstructive pulmonary disease) (H)     Primary thunderclap headache     Headache     Intractable headache     Panic attack     Hypothyroidism, unspecified type     Chronic pain of both knees       Blood pressure 128/86, pulse 60, temperature 97.5  F (36.4  C), temperature source Tympanic, resp. rate 16, height 1.676 m (5' 6\"), weight 85.7 kg (189 lb), SpO2 96 %, not currently breastfeeding.    Exam:  GENERAL APPEARANCE: healthy, alert and no distress  EYES: EOMI,  PERRL  HENT: ear canals and TM's normal and nose and mouth without ulcers or lesions  NECK: no adenopathy, no asymmetry, masses, or scars and thyroid normal to palpation  RESP: lungs clear to auscultation - no rales, rhonchi or wheezes  CV: regular rates " and rhythm, normal S1 S2, no S3 or S4 and no murmur, click or rub -  ABDOMEN:  soft, nontender, no HSM or masses and bowel sounds normal  MS: extremities normal- no gross deformities noted, no evidence of inflammation in joints, FROM in all extremities.  SKIN: no suspicious lesions or rashes  PSYCH: mentation appears normal and affect normal/bright  LYMPHATICS: No axillary, cervical, inguinal, or supraclavicular nodes      (R53.83) Fatigue  (primary encounter diagnosis)  Comment:   Plan: CBC with platelets differential, Comprehensive         metabolic panel, Lyme Disease Constance with reflex         to WB Serum        Do the above labs today. Avoid caffeine and alcohol. Exercise daily. Avoid OTC allergy and cold medications.   We will call the results and recommendations. If all the tests are normal and the symptoms continue then recheck in clinic.   Monitor for symptoms of depression.     (E03.9) Hypothyroidism, unspecified type  Comment:   Plan: TSH, T4 FREE, Lipid panel reflex to direct LDL         Fasting        See above. The thyroid labs are ordered and will be called.       Travon Arguello MD

## 2019-10-04 NOTE — PATIENT INSTRUCTIONS
Thank you for choosing [facility].  You may be receiving an email and/or telephone survey request from [facility] Customer Experience regarding your visit today.  Please take a few minutes to respond to the survey to let us know how we are doing.      If you have questions or concerns, please contact us via [place] or you can contact your care team at [phone].    Our Clinic hours are:  Monday 6:40 am  to 7:00 pm  Tuesday -Friday 6:40 am to 5:00 pm    The [place] outpatient lab hours are:  Monday - Friday 6:10 am to 4:45 pm  Saturdays 7:00 am to 11:00 am  Appointments are required, call [phone]    If you have clinical questions after hours or would like to schedule an appointment,  call the clinic at [phone].      (R53.83) Fatigue  (primary encounter diagnosis)  Comment:   Plan: CBC with platelets differential, Comprehensive         metabolic panel, Lyme Disease Constance with reflex         to WB Serum        Do the above labs today. Avoid caffeine and alcohol. Exercise daily. Avoid OTC allergy and cold medications.   We will call the results and recommendations. If all the tests are normal and the symptoms continue then recheck in clinic.   Monitor for symptoms of depression.     (E03.9) Hypothyroidism, unspecified type  Comment:   Plan: TSH, T4 FREE, Lipid panel reflex to direct LDL         Fasting        See above. The thyroid labs are ordered and will be called.

## 2019-10-05 ASSESSMENT — ANXIETY QUESTIONNAIRES: GAD7 TOTAL SCORE: 0

## 2020-01-05 DIAGNOSIS — F33.0 MAJOR DEPRESSIVE DISORDER, RECURRENT EPISODE, MILD (H): ICD-10-CM

## 2020-01-06 NOTE — TELEPHONE ENCOUNTER
"Requested Prescriptions   Pending Prescriptions Disp Refills     citalopram (CELEXA) 40 MG tablet [Pharmacy Med Name: CITALOPRAM  Last Written Prescription Date:  09/20/18  Last Fill Quantity: 90,  # refills: 3   Last office visit: 10/4/2019 with prescribing provider:  Travon Arguello   Future Office Visit:     HYDROBROMIDE 40 MG Tablet] 90 tablet 3     Sig: TAKE 1 TABLET EVERY DAY       SSRIs Protocol Passed - 1/5/2020  3:07 PM        Passed - PHQ-9 score less than 5 in past 6 months     Please review last PHQ-9 score.   PHQ-9 SCORE 9/20/2018 5/9/2019 10/4/2019   PHQ-9 Total Score - - -   PHQ-9 Total Score MyChart - - -   PHQ-9 Total Score 3 0 3           Passed - Medication is active on med list        Passed - Patient is age 18 or older        Passed - No active pregnancy on record        Passed - No positive pregnancy test in last 12 months        Passed - Recent (6 mo) or future (30 days) visit within the authorizing provider's specialty     Patient had office visit in the last 6 months or has a visit in the next 30 days with authorizing provider or within the authorizing provider's specialty.  See \"Patient Info\" tab in inbasket, or \"Choose Columns\" in Meds & Orders section of the refill encounter.              "

## 2020-01-07 NOTE — TELEPHONE ENCOUNTER
10/4/19 OV note: 'Taking Medication as prescribed: yes, she is taking 20 mg daily, not 40 mg.'    Med list currently has: citalopram (CELEXA) 40 MG tablet - Take 1 tablet (40 mg) by mouth daily     Order 20mg tabs?    Mehreen AC RN

## 2020-01-09 RX ORDER — CITALOPRAM HYDROBROMIDE 40 MG/1
TABLET ORAL
Qty: 90 TABLET | Refills: 3 | Status: SHIPPED | OUTPATIENT
Start: 2020-01-09 | End: 2020-09-28

## 2020-07-14 DIAGNOSIS — E03.9 HYPOTHYROIDISM, UNSPECIFIED TYPE: ICD-10-CM

## 2020-07-14 RX ORDER — LEVOTHYROXINE SODIUM 112 UG/1
TABLET ORAL
Qty: 90 TABLET | Refills: 0 | Status: SHIPPED | OUTPATIENT
Start: 2020-07-14 | End: 2020-09-28

## 2020-07-14 NOTE — TELEPHONE ENCOUNTER
"Requested Prescriptions   Pending Prescriptions Disp Refills     levothyroxine (SYNTHROID/LEVOTHROID) 112 MCG tablet [Pharmacy Med Name: LEVOTHYROXINE SODIUM 112 MCG Tablet] 90 tablet 2     Sig: TAKE 1 TABLET EVERY DAY       Thyroid Protocol Passed - 7/14/2020  1:12 AM        Passed - Patient is 12 years or older        Passed - Recent (12 mo) or future (30 days) visit within the authorizing provider's specialty     Patient has had an office visit with the authorizing provider or a provider within the authorizing providers department within the previous 12 mos or has a future within next 30 days. See \"Patient Info\" tab in inbasket, or \"Choose Columns\" in Meds & Orders section of the refill encounter.              Passed - Medication is active on med list        Passed - Normal TSH on file in past 12 months     Recent Labs   Lab Test 10/04/19  0926   TSH 0.51              Passed - No active pregnancy on record     If patient is pregnant or has had a positive pregnancy test, please check TSH.          Passed - No positive pregnancy test in past 12 months     If patient is pregnant or has had a positive pregnancy test, please check TSH.               "

## 2020-07-14 NOTE — LETTER
[facility]  [address] [address]  Phone: [phone]        July 14, 2020      Prakash HORTA [address] [place] [id number]            Dear Ms. Golden,    We are concerned about your health care.  We recently provided you with a medication refill.  Many medications require routine follow-up with your Doctor.      At this time we ask that: You schedule a routine office visit with your physician to follow your thyroid.    Your prescription: Has been refilled for 90 days so you may have time for the above noted follow-up.      Thank you,      Travon Arguello MD / luis

## 2020-07-18 ENCOUNTER — MYC REFILL (OUTPATIENT)
Dept: FAMILY MEDICINE | Facility: CLINIC | Age: 65
End: 2020-07-18

## 2020-07-18 DIAGNOSIS — E03.9 HYPOTHYROIDISM, UNSPECIFIED TYPE: ICD-10-CM

## 2020-07-18 RX ORDER — LEVOTHYROXINE SODIUM 112 UG/1
TABLET ORAL
Qty: 90 TABLET | Refills: 0 | Status: CANCELLED | OUTPATIENT
Start: 2020-07-18

## 2020-07-22 NOTE — TELEPHONE ENCOUNTER
"    Requested Prescriptions   Pending Prescriptions Disp Refills     levothyroxine (SYNTHROID/LEVOTHROID) 112 MCG tablet 90 tablet 0       Thyroid Protocol Passed - 7/18/2020 12:43 PM        Passed - Patient is 12 years or older        Passed - Recent (12 mo) or future (30 days) visit within the authorizing provider's specialty     Patient has had an office visit with the authorizing provider or a provider within the authorizing providers department within the previous 12 mos or has a future within next 30 days. See \"Patient Info\" tab in inbasket, or \"Choose Columns\" in Meds & Orders section of the refill encounter.              Passed - Medication is active on med list        Passed - Normal TSH on file in past 12 months     Recent Labs   Lab Test 10/04/19  0926   TSH 0.51              Passed - No active pregnancy on record     If patient is pregnant or has had a positive pregnancy test, please check TSH.          Passed - No positive pregnancy test in past 12 months     If patient is pregnant or has had a positive pregnancy test, please check TSH.               "

## 2020-09-28 ENCOUNTER — OFFICE VISIT (OUTPATIENT)
Dept: FAMILY MEDICINE | Facility: CLINIC | Age: 65
End: 2020-09-28
Payer: COMMERCIAL

## 2020-09-28 VITALS
RESPIRATION RATE: 16 BRPM | WEIGHT: 190 LBS | TEMPERATURE: 97.5 F | OXYGEN SATURATION: 97 % | HEIGHT: 66 IN | HEART RATE: 60 BPM | DIASTOLIC BLOOD PRESSURE: 84 MMHG | SYSTOLIC BLOOD PRESSURE: 132 MMHG | BODY MASS INDEX: 30.53 KG/M2

## 2020-09-28 DIAGNOSIS — E03.9 HYPOTHYROIDISM, UNSPECIFIED TYPE: Primary | ICD-10-CM

## 2020-09-28 DIAGNOSIS — F33.0 MAJOR DEPRESSIVE DISORDER, RECURRENT EPISODE, MILD (H): ICD-10-CM

## 2020-09-28 DIAGNOSIS — Z12.31 ENCOUNTER FOR SCREENING MAMMOGRAM FOR BREAST CANCER: ICD-10-CM

## 2020-09-28 LAB
T4 FREE SERPL-MCNC: 1.25 NG/DL (ref 0.76–1.46)
TSH SERPL DL<=0.005 MIU/L-ACNC: 0.6 MU/L (ref 0.4–4)

## 2020-09-28 PROCEDURE — 84439 ASSAY OF FREE THYROXINE: CPT | Performed by: FAMILY MEDICINE

## 2020-09-28 PROCEDURE — 99214 OFFICE O/P EST MOD 30 MIN: CPT | Performed by: FAMILY MEDICINE

## 2020-09-28 PROCEDURE — 36415 COLL VENOUS BLD VENIPUNCTURE: CPT | Performed by: FAMILY MEDICINE

## 2020-09-28 PROCEDURE — 84443 ASSAY THYROID STIM HORMONE: CPT | Performed by: FAMILY MEDICINE

## 2020-09-28 RX ORDER — LEVOTHYROXINE SODIUM 112 UG/1
TABLET ORAL
Qty: 90 TABLET | Refills: 3 | Status: SHIPPED | OUTPATIENT
Start: 2020-09-28

## 2020-09-28 RX ORDER — CITALOPRAM HYDROBROMIDE 40 MG/1
TABLET ORAL
Qty: 90 TABLET | Refills: 3 | Status: SHIPPED | OUTPATIENT
Start: 2020-09-28

## 2020-09-28 ASSESSMENT — ANXIETY QUESTIONNAIRES
6. BECOMING EASILY ANNOYED OR IRRITABLE: NOT AT ALL
3. WORRYING TOO MUCH ABOUT DIFFERENT THINGS: SEVERAL DAYS
5. BEING SO RESTLESS THAT IT IS HARD TO SIT STILL: NOT AT ALL
7. FEELING AFRAID AS IF SOMETHING AWFUL MIGHT HAPPEN: NOT AT ALL
1. FEELING NERVOUS, ANXIOUS, OR ON EDGE: NOT AT ALL
2. NOT BEING ABLE TO STOP OR CONTROL WORRYING: SEVERAL DAYS
IF YOU CHECKED OFF ANY PROBLEMS ON THIS QUESTIONNAIRE, HOW DIFFICULT HAVE THESE PROBLEMS MADE IT FOR YOU TO DO YOUR WORK, TAKE CARE OF THINGS AT HOME, OR GET ALONG WITH OTHER PEOPLE: NOT DIFFICULT AT ALL
GAD7 TOTAL SCORE: 2

## 2020-09-28 ASSESSMENT — PATIENT HEALTH QUESTIONNAIRE - PHQ9
SUM OF ALL RESPONSES TO PHQ QUESTIONS 1-9: 0
5. POOR APPETITE OR OVEREATING: NOT AT ALL

## 2020-09-28 ASSESSMENT — MIFFLIN-ST. JEOR: SCORE: 1427.55

## 2020-09-28 NOTE — PROGRESS NOTES
Subjective     Prakash Golden is a 65 year old female who presents to clinic today for the following health issues:    HPI       Anxiety Follow-Up    How are you doing with your anxiety since your last visit? No change    Are you having other symptoms that might be associated with anxiety? Yes:  Stress at times as she will be leaving for [place] in October.    Have you had a significant life event? No     Are you feeling depressed? No    Do you have any concerns with your use of alcohol or other drugs? No    Social History     Tobacco Use     Smoking status: Current Some Day Smoker     Packs/day: 0.40     Years: 35.00     Pack years: 14.00     Types: Cigarettes     Last attempt to quit: 11/15/2012     Years since quittin.8     Smokeless tobacco: Never Used     Tobacco comment: very little   Substance Use Topics     Alcohol use: Yes     Comment: rare     Drug use: Not Currently     SANKET-7 SCORE 2018 2019 10/4/2019   Total Score - - -   Total Score 0 0 0     PHQ 2018 2019 10/4/2019   PHQ-9 Total Score 3 0 3   Q9: Thoughts of better off dead/self-harm past 2 weeks Not at all Not at all Not at all     Last PHQ-9 10/4/2019   1.  Little interest or pleasure in doing things 0   2.  Feeling down, depressed, or hopeless 0   3.  Trouble falling or staying asleep, or sleeping too much 0   4.  Feeling tired or having little energy 3   5.  Poor appetite or overeating 0   6.  Feeling bad about yourself 0   7.  Trouble concentrating 0   8.  Moving slowly or restless 0   Q9: Thoughts of better off dead/self-harm past 2 weeks 0   PHQ-9 Total Score 3   Difficulty at work, home, or with people Not difficult at all     SANKET-7  10/4/2019   1. Feeling nervous, anxious, or on edge 0   2. Not being able to stop or control worrying 0   3. Worrying too much about different things 0   4. Trouble relaxing 0   5. Being so restless that it is hard to sit still 0   6. Becoming easily annoyed or irritable 0   7. Feeling  afraid, as if something awful might happen 0   SANKET-7 Total Score 0   If you checked any problems, how difficult have they made it for you to do your work, take care of things at home, or get along with other people? Not difficult at all       Hypothyroidism Follow-up      Since last visit, patient describes the following symptoms: loose stools and anxiety      How many servings of fruits and vegetables do you eat daily?  1 serving per day.    On average, how many sweetened beverages do you drink each day (Examples: soda, juice, sweet tea, etc.  Do NOT count diet or artificially sweetened beverages)?   0    How many days per week do you exercise enough to make your heart beat faster? None.    How many minutes a day do you exercise enough to make your heart beat faster? None.    How many days per week do you miss taking your medication? 0      Current Outpatient Medications   Medication Instructions     citalopram (CELEXA) 40 MG tablet TAKE 1 TABLET EVERY DAY     fluticasone (FLONASE) 50 MCG/ACT nasal spray 1 spray, Both Nostrils, 2 TIMES DAILY     IBUPROFEN  mg     levothyroxine (SYNTHROID/LEVOTHROID) 112 MCG tablet TAKE 1 TABLET EVERY DAY     meclizine (ANTIVERT) 25 mg, Oral, EVERY 6 HOURS PRN   No side effects of medication.      Patient Active Problem List   Diagnosis     Osteoarthritis     Foot pain     Intractable neuropathic pain secondary to bilateral plantar fasciitis     Plantar fasciitis     Vitamin D deficiency     GERD (gastroesophageal reflux disease)     HYPERLIPIDEMIA LDL GOAL <130     Health Care Home     Mild major depression (H)     Advanced directives, counseling/discussion     Melanocytic nevus     Neoplasm of uncertain behavior of skin     Angioma     COPD (chronic obstructive pulmonary disease) (H)     Primary thunderclap headache     Headache     Intractable headache     Panic attack     Hypothyroidism, unspecified type     Chronic pain of both knees       Blood pressure 132/84, pulse 60,  "temperature 97.5  F (36.4  C), temperature source Tympanic, resp. rate 16, height 1.683 m (5' 6.25\"), weight 86.2 kg (190 lb), SpO2 97 %, not currently breastfeeding.    Exam:  GENERAL APPEARANCE: healthy, alert and no distress  EYES: EOMI,  PERRL  NECK: no adenopathy, no asymmetry, masses, or scars and thyroid normal to palpation  RESP: lungs clear to auscultation - no rales, rhonchi or wheezes  CV: regular rates and rhythm, normal S1 S2, no S3 or S4 and no murmur, click or rub -  SKIN: no suspicious lesions or rashes  PSYCH: mentation appears normal and affect normal/bright      (E03.9) Hypothyroidism, unspecified type  (primary encounter diagnosis)  Comment:   Plan: TSH, T4 FREE, levothyroxine         (SYNTHROID/LEVOTHROID) 112 MCG tablet        Stay on the med and do the above labs today. We will call the results.   If doing well then refills are done for one year. Monitor for the symptoms of high and low thyroid.     (F33.0) Major depressive disorder, recurrent episode, mild (H)  Comment:   Plan: citalopram (CELEXA) 40 MG tablet        Stay on the med and use the non drug therapies. Avoid stimulants.   Exercise daily. Refills are done. PHQ is zero today!!!    (Z12.31) Encounter for screening mammogram for breast cancer  Comment:   Plan: *MA Screening Digital Bilateral        Call [phone] to schedule. We will call the results.     Travon Arguello MD          "

## 2020-09-28 NOTE — PATIENT INSTRUCTIONS
Thank you for choosing [facility].  You may be receiving an email and/or telephone survey request from [facility] Customer Experience regarding your visit today.  Please take a few minutes to respond to the survey to let us know how we are doing.      If you have questions or concerns, please contact us via [place] or you can contact your care team at [phone].    Our Clinic hours are:  Monday 6:40 am  to 7:00 pm  Tuesday -Friday 6:40 am to 5:00 pm    The [place] outpatient lab hours are:  Monday - Friday 6:10 am to 4:45 pm  Saturdays 7:00 am to 11:00 am  Appointments are required, call [phone]    If you have clinical questions after hours or would like to schedule an appointment,  call the clinic at [phone].    (E03.9) Hypothyroidism, unspecified type  (primary encounter diagnosis)  Comment:   Plan: TSH, T4 FREE, levothyroxine         (SYNTHROID/LEVOTHROID) 112 MCG tablet        Stay on the med and do the above labs today. We will call the results.   If doing well then refills are done for one year. Monitor for the symptoms of high and low thyroid.     (F33.0) Major depressive disorder, recurrent episode, mild (H)  Comment:   Plan: citalopram (CELEXA) 40 MG tablet        Stay on the med and use the non drug therapies. Avoid stimulants.   Exercise daily. Refills are done. PHQ is zero today!!!    (Z12.31) Encounter for screening mammogram for breast cancer  Comment:   Plan: *MA Screening Digital Bilateral        Call [phone] to schedule. We will call the results.

## 2020-09-29 ASSESSMENT — ANXIETY QUESTIONNAIRES: GAD7 TOTAL SCORE: 2

## 2020-10-30 NOTE — ED PROVIDER NOTES
"  History     Chief Complaint   Patient presents with     Flank Pain     Rt side flank pain 12 hrs     HPI  Prakash Golden is a 63 year old female who presents for right flank pain.  Symptoms started about 12 hours prior to arrival, pain is described as cramping, waxes and wanes, she is not sure what makes it better or worse.  Pain is rated as severe, radiates to the left back.  She denies any injury.  She denies any dysuria or urinary frequency.  No vaginal bleeding or discharge.  She has had a prior appendicectomy.  She has had nausea and dry heaves.  Normal bowel movement earlier.  She denies fever, chills, chest pain, shortness of breath, cough, or rash.    Problem List:    Patient Active Problem List    Diagnosis Date Noted     Hypothyroidism, unspecified type 05/16/2018     Priority: Medium     Panic attack 08/22/2016     Priority: Medium     Intractable headache 01/09/2016     Priority: Medium     Headache 12/13/2015     Priority: Medium     Primary thunderclap headache 12/12/2015     Priority: Medium     COPD (chronic obstructive pulmonary disease) (H) 11/20/2012     Priority: Medium     Melanocytic nevus 04/11/2012     Priority: Medium     (Problem list name updated by automated process. Provider to review and confirm.)       Neoplasm of uncertain behavior of skin 04/11/2012     Priority: Medium     Angioma 04/11/2012     Priority: Medium     Advanced directives, counseling/discussion 03/20/2012     Priority: Medium     Patient does not have an Advance/Health Care Directive (HCD), given \"What is Advance Care Planning?\" flyer, accepts referral to Facilitator and requests blank HCD form.    Diamond Esquivel  March 20, 2012         Mild major depression (H) 11/16/2011     Priority: Medium     Health Care Home 10/16/2011     Priority: Medium     EMERGENCY CARE PLAN  Presenting Problem Signs and Symptoms Treatment Plan    Questions or conerns during clinic hours    I will call the clinic directly     Questions or " sakina outside clinic hours    I will call the 24 hour nurse line at [phone]    Patient needs to schedule an appointment    I will call the 24 hour scheduling team at [phone] or clinic directly    Same day treatment     I will call the clinic first, nurse line if after hours, urgent care and express care if needed                            DX V65.8 REPLACED WITH [id number] HEALTH CARE HOME (04/08/2013)       HYPERLIPIDEMIA LDL GOAL <130 10/31/2010     Priority: Medium     GERD (gastroesophageal reflux disease) 06/24/2010     Priority: Medium     Plantar fasciitis 10/09/2009     Priority: Medium     Vitamin D deficiency 10/09/2009     Priority: Medium     Foot pain 05/22/2009     Priority: Medium     Intractable neuropathic pain secondary to bilateral plantar fasciitis 05/22/2009     Priority: Medium     Patient is followed by NAILA MALIK for ongoing prescription of narcotic pain medicine.  Med: Tylenol #3.   Maximum use per month: 15  Expected duration: Lifetime  Narcotic agreement on file: YES  Clinic visit recommended: 1 year       Osteoarthritis 12/18/2008     Priority: Medium        Past Medical History:    Past Medical History:   Diagnosis Date     Abnormal Pap smear of cervix ?     Unspecified hypothyroidism        Past Surgical History:    Past Surgical History:   Procedure Laterality Date     C LIGATE FALLOPIAN TUBE  8/1989    Laparoscopic tubal occlusion by cautery     HC REMOVE TONSILS/ADENOIDS,12+ Y/O      T & A 12+y.o.     LAPAROSCOPIC APPENDECTOMY  5/19/2013    Procedure: LAPAROSCOPIC APPENDECTOMY;;  Surgeon: Christiano Wall MD;  Location: [facility]     SURGICAL HISTORY OF -   2002    Eye Surgery-B/L varices     SURGICAL HISTORY OF -       four normal spontaneous vaginal deliveries     SURGICAL HISTORY OF -   8/1977    Proctoscopy     SURGICAL HISTORY OF -   12/1976    RML Episiotomy     SURGICAL HISTORY OF -   2/2002    Bilateral orbital fat decompression       Family History:   "  Family History   Problem Relation Age of Onset     Musculoskeletal Disorder Mother      Lipids Mother      Thyroid Disease Sister      Diabetes Son      Alcohol/Drug Son        Social History:  Marital Status:  Single [1]  Social History   Substance Use Topics     Smoking status: Current Some Day Smoker     Packs/day: 0.40     Years: 35.00     Types: Cigarettes     Last attempt to quit: 11/15/2012     Smokeless tobacco: Never Used      Comment: very little     Alcohol use Yes      Comment: rare        Medications:      ondansetron (ZOFRAN ODT) 4 MG ODT tab   oxyCODONE IR (ROXICODONE) 5 MG tablet   sulfamethoxazole-trimethoprim (BACTRIM DS) 800-160 MG per tablet   citalopram (CELEXA) 40 MG tablet   IBUPROFEN PO   levothyroxine (SYNTHROID/LEVOTHROID) 112 MCG tablet   meclizine (ANTIVERT) 25 MG tablet   Omega-3 Fatty Acids (OMEGA-3 FISH OIL PO)         Review of Systems  Pertinent positives and negatives listed in the HPI, all other systems reviewed and are negative.    Physical Exam   BP: (!) 162/100  Heart Rate: 74  Temp: 98.2  F (36.8  C)  Resp: 20  Height: 167.6 cm (5' 6\")  Weight: 81.6 kg (180 lb)  SpO2: 97 %      Physical Exam   Constitutional: She is oriented to person, place, and time. She appears well-developed and well-nourished. She appears distressed.   HENT:   Head: Normocephalic and atraumatic.   Right Ear: External ear normal.   Left Ear: External ear normal.   Nose: Nose normal.   Eyes: Conjunctivae are normal. No scleral icterus.   Neck: Normal range of motion.   Cardiovascular: Normal rate and regular rhythm.    Pulmonary/Chest: Effort normal. No stridor. No respiratory distress.   Abdominal: Soft. She exhibits no distension. There is no tenderness. There is CVA tenderness (right).   Neurological: She is alert and oriented to person, place, and time.   Skin: Skin is warm and dry. She is not diaphoretic.   Psychiatric: She has a normal mood and affect. Her behavior is normal.   Nursing note and " varicose veins vitals reviewed.      ED Course     ED Course     Procedures               Critical Care time:  none               Results for orders placed or performed during the hospital encounter of 08/13/18 (from the past 24 hour(s))   UA reflex to Microscopic   Result Value Ref Range    Color Urine Yellow     Appearance Urine Cloudy     Glucose Urine Negative NEG^Negative mg/dL    Bilirubin Urine Negative NEG^Negative    Ketones Urine Negative NEG^Negative mg/dL    Specific Gravity Urine 1.025 1.003 - 1.035    Blood Urine Negative NEG^Negative    pH Urine 5.0 5.0 - 7.0 pH    Protein Albumin Urine Negative NEG^Negative mg/dL    Urobilinogen mg/dL 0.0 0.0 - 2.0 mg/dL    Nitrite Urine Negative NEG^Negative    Leukocyte Esterase Urine Large (A) NEG^Negative    Source Midstream Urine     RBC Urine 4 (H) 0 - 2 /HPF    WBC Urine >182 (H) 0 - 5 /HPF    Bacteria Urine Few (A) NEG^Negative /HPF    Squamous Epithelial /HPF Urine 12 (H) 0 - 1 /HPF    Transitional Epi 2 (H) 0 - 1 /HPF    Mucous Urine Present (A) NEG^Negative /LPF   Urine Culture Aerobic Bacterial   Result Value Ref Range    Specimen Description Midstream Urine     Special Requests Specimen received in preservative     Culture Micro PENDING    CBC with platelets differential   Result Value Ref Range    WBC 10.5 4.0 - 11.0 10e9/L    RBC Count 4.69 3.8 - 5.2 10e12/L    Hemoglobin 15.2 11.7 - 15.7 g/dL    Hematocrit 45.9 35.0 - 47.0 %    MCV 98 78 - 100 fl    MCH 32.4 26.5 - 33.0 pg    MCHC 33.1 31.5 - 36.5 g/dL    RDW 12.6 10.0 - 15.0 %    Platelet Count 225 150 - 450 10e9/L    Diff Method Automated Method     % Neutrophils 46.2 %    % Lymphocytes 42.8 %    % Monocytes 8.5 %    % Eosinophils 2.0 %    % Basophils 0.3 %    % Immature Granulocytes 0.2 %    Nucleated RBCs 0 0 /100    Absolute Neutrophil 4.9 1.6 - 8.3 10e9/L    Absolute Lymphocytes 4.5 0.8 - 5.3 10e9/L    Absolute Monocytes 0.9 0.0 - 1.3 10e9/L    Absolute Eosinophils 0.2 0.0 - 0.7 10e9/L    Absolute Basophils  0.0 0.0 - 0.2 10e9/L    Abs Immature Granulocytes 0.0 0 - 0.4 10e9/L    Absolute Nucleated RBC 0.0    Comprehensive metabolic panel   Result Value Ref Range    Sodium 142 133 - 144 mmol/L    Potassium 3.8 3.4 - 5.3 mmol/L    Chloride 109 94 - 109 mmol/L    Carbon Dioxide 26 20 - 32 mmol/L    Anion Gap 7 3 - 14 mmol/L    Glucose 98 70 - 99 mg/dL    Urea Nitrogen 22 7 - 30 mg/dL    Creatinine 0.82 0.52 - 1.04 mg/dL    GFR Estimate 70 >60 mL/min/1.7m2    GFR Estimate If Black 85 >60 mL/min/1.7m2    Calcium 8.5 8.5 - 10.1 mg/dL    Bilirubin Total 0.3 0.2 - 1.3 mg/dL    Albumin 3.7 3.4 - 5.0 g/dL    Protein Total 7.0 6.8 - 8.8 g/dL    Alkaline Phosphatase 62 40 - 150 U/L    ALT 37 0 - 50 U/L    AST 19 0 - 45 U/L   Abd/pelvis CT - no contrast - Stone Protocol    Narrative    CT ABDOMEN AND PELVIS WITHOUT CONTRAST   8/13/2018 2:06 AM     HISTORY: Right flank pain.    COMPARISON: None.    TECHNIQUE: 5/19/2013.    FINDINGS:     Right urinary tract: No renal or ureteral calculi. No dilatation of  the intrarenal collecting system or ureter.    Left urinary tract: No renal or ureteral calculi. No dilatation of the  intrarenal collecting system or ureter.    Urinary bladder: No visualized calculi.    Remainder of the abdomen and pelvis: Slight diffuse fatty infiltration  of the liver. The liver, spleen, pancreas and adrenal glands are  unremarkable to the limits of a noncontrast CT scan. The gallbladder  is present. The small and large bowel are normal in caliber. A few  colonic diverticula are present without evidence of diverticulitis.  The appendix is not visualized. No bowel wall thickening, pneumatosis  or free intraperitoneal gas. The uterus is present. No enlarged lymph  nodes or free fluid in the abdomen or pelvis.    Scan through the lower chest is unremarkable.      Impression    IMPRESSION:   1. No cause of acute pain identified in the abdomen or pelvis.  2. No renal or ureteral calculi or evidence of urinary  obstruction.  3. A few colonic diverticula are present without evidence of  diverticulitis.    MEENA AMES MD   Pelvic Ultrasound (US) with doppler imaging (r/o ovarian torsion)    Narrative    ULTRASOUND PELVIS WITH TRANSVAGINAL IMAGING AND DOPPLER   8/13/2018  3:13 AM     HISTORY: Right adnexal pain radiating to the right flank.    COMPARISON: 8/13/2018 - CT chest performed the same day as this study.    TECHNIQUE: Endovaginal imaging was also performed to better evaluate  the ovaries. Spectral waveform and color Doppler evaluation were  performed of the ovaries.     FINDINGS: The uterus is retroflexed, measuring 6.4 x 2.8 x 4.5 cm in  long axis, short axis and transverse dimensions, respectively. No  myometrial masses. The endometrial stripe measures 0.5 cm in  thickness. The right and left ovaries are unremarkable, measuring 3.0  x 2.3 x 1.8 cm and 2.3 x 2.1 x 1.3 cm, respectively. Blood flow is  visualized in both ovaries. No adnexal masses. No free fluid in the  pelvis.      Impression    IMPRESSION:   1. Unremarkable appearance of the uterus and ovaries. Blood flow is  visualized in both ovaries.  2. No free fluid in the pelvis.    MEENA AMES MD       Medications   HYDROmorphone (PF) (DILAUDID) injection 0.5 mg (0.5 mg Intravenous Given 8/13/18 0233)   0.9% sodium chloride BOLUS (0 mLs Intravenous Stopped 8/13/18 0300)   ondansetron (ZOFRAN) injection 4 mg (4 mg Intravenous Given 8/13/18 0129)   ketorolac (TORADOL) injection 15 mg (15 mg Intravenous Given 8/13/18 0153)   sulfamethoxazole-trimethoprim (BACTRIM DS/SEPTRA DS) 800-160 MG per tablet 1 tablet (1 tablet Oral Given 8/13/18 0436)       Assessments & Plan (with Medical Decision Making)   63-year-old female presents with right flank pain.  Blood pressure 122/78, heart rate 80, temperature is 98.4 F, SPO2 is 90% on room air.  She is given IV ondansetron, fluids, ketorolac, hydromorphone for symptoms.  Urinalysis is positive for white blood  cells, leukocyte esterase, and 4 red blood cells, negative nitrite.  This certainly is concerning for urinary tract infection, however the patient does not have symptoms consistent with that without dysuria or urinary frequency.  Therefore CT of the abdomen/pelvis obtained, images reviewed independently radiology read reviewed, no signs of renal or ureteral calculi or obstruction, no signs of diverticulitis, no signs of small bowel obstruction.  On recheck the patient is still very uncomfortable.  White blood cell count is 10.5 which is reassuring.  Hemoglobin 15.2.  Electrolytes are within normal limits.  LFTs normal without signs of hepatitis.  Ultrasound of the ovaries obtained, images reviewed independently as well as radiology read reviewed, no signs of ovarian torsion.  Recheck she is still uncomfortable but feels slightly improved.  She is tolerating oral intake.  At this point likely acute pyelonephritis as a cause of her symptoms she will be discharged with a prescription for Bactrim with symptomatic management with oxycodone and ondansetron.  She is given a first dose of Bactrim here in the emergency department.  She is told to return if she has worsening symptoms, repeated vomiting, or other concerns, otherwise follow-up in clinic if not improved in several days.  The patient is in agreement with this plan.    I have reviewed the nursing notes.    I have reviewed the findings, diagnosis, plan and need for follow up with the patient.       Discharge Medication List as of 8/13/2018  4:28 AM      START taking these medications    Details   oxyCODONE IR (ROXICODONE) 5 MG tablet Take 1 tablet (5 mg) by mouth every 6 hours as needed for pain, Disp-6 tablet, R-0, Local Print      sulfamethoxazole-trimethoprim (BACTRIM DS) 800-160 MG per tablet Take 1 tablet by mouth 2 times daily for 10 days, Disp-20 tablet, R-0, E-Prescribe             Final diagnoses:   Acute pyelonephritis       8/13/2018   [facility]  EMERGENCY DEPARTMENT     Daniel Ingram MD  08/13/18 [id number]

## 2020-11-08 ENCOUNTER — HEALTH MAINTENANCE LETTER (OUTPATIENT)
Age: 65
End: 2020-11-08

## 2021-07-17 ENCOUNTER — HEALTH MAINTENANCE LETTER (OUTPATIENT)
Age: 66
End: 2021-07-17

## 2021-09-11 ENCOUNTER — HEALTH MAINTENANCE LETTER (OUTPATIENT)
Age: 66
End: 2021-09-11

## 2022-01-01 ENCOUNTER — HEALTH MAINTENANCE LETTER (OUTPATIENT)
Age: 67
End: 2022-01-01

## 2022-05-02 NOTE — TELEPHONE ENCOUNTER
Panel Management Review      Patient has the following on her problem list:     Depression / Dysthymia review  PHQ-9 SCORE 4/9/2015 2/3/2016 8/17/2016   Total Score 6 - -   Total Score - 3 10      Patient is due for:  PHQ9      Composite cancer screening  Chart review shows that this patient is due/due soon for the following Mammogram  Summary:    Patient is due/failing the following:   MAMMOGRAM and PHQ9    Action needed:   Patient needs referral/order: mammogram    Type of outreach:    Sent [employer] message.    Questions for provider review:    None                                                                                                                                    Nella Feliz MA       Chart routed to Care Team .           No

## 2022-10-29 ENCOUNTER — HEALTH MAINTENANCE LETTER (OUTPATIENT)
Age: 67
End: 2022-10-29

## 2023-04-08 ENCOUNTER — HEALTH MAINTENANCE LETTER (OUTPATIENT)
Age: 68
End: 2023-04-08

## 2023-05-10 ENCOUNTER — HOSPITAL ENCOUNTER (EMERGENCY)
Facility: CLINIC | Age: 68
Discharge: HOME | End: 2023-05-10
Attending: FAMILY MEDICINE | Admitting: FAMILY MEDICINE
Payer: COMMERCIAL

## 2023-05-10 VITALS
BODY MASS INDEX: 29.73 KG/M2 | TEMPERATURE: 98 F | SYSTOLIC BLOOD PRESSURE: 118 MMHG | DIASTOLIC BLOOD PRESSURE: 65 MMHG | WEIGHT: 185 LBS | HEIGHT: 66 IN | RESPIRATION RATE: 18 BRPM | HEART RATE: 54 BPM | OXYGEN SATURATION: 95 %

## 2023-05-10 DIAGNOSIS — R82.90 ABNORMAL URINE: ICD-10-CM

## 2023-05-10 DIAGNOSIS — M54.50 LUMBAR BACK PAIN: ICD-10-CM

## 2023-05-10 LAB
ALBUMIN UR-MCNC: NEGATIVE MG/DL
APPEARANCE UR: ABNORMAL
BILIRUB UR QL STRIP: NEGATIVE
COLOR UR AUTO: YELLOW
GLUCOSE UR STRIP-MCNC: NEGATIVE MG/DL
HGB UR QL STRIP: NEGATIVE
KETONES UR STRIP-MCNC: NEGATIVE MG/DL
LEUKOCYTE ESTERASE UR QL STRIP: ABNORMAL
MUCOUS THREADS #/AREA URNS LPF: PRESENT /LPF
NITRATE UR QL: NEGATIVE
PH UR STRIP: 5 [PH] (ref 5–7)
RBC URINE: 1 /HPF
SP GR UR STRIP: 1.02 (ref 1–1.03)
SQUAMOUS EPITHELIAL: 2 /HPF
UROBILINOGEN UR STRIP-MCNC: NORMAL MG/DL
WBC URINE: 15 /HPF

## 2023-05-10 PROCEDURE — 76705 ECHO EXAM OF ABDOMEN: CPT

## 2023-05-10 PROCEDURE — 87086 URINE CULTURE/COLONY COUNT: CPT | Performed by: FAMILY MEDICINE

## 2023-05-10 PROCEDURE — 99285 EMERGENCY DEPT VISIT HI MDM: CPT | Mod: 25

## 2023-05-10 PROCEDURE — 81001 URINALYSIS AUTO W/SCOPE: CPT | Performed by: FAMILY MEDICINE

## 2023-05-10 PROCEDURE — 250N000011 HC RX IP 250 OP 636: Performed by: FAMILY MEDICINE

## 2023-05-10 PROCEDURE — 96374 THER/PROPH/DIAG INJ IV PUSH: CPT

## 2023-05-10 PROCEDURE — 250N000013 HC RX MED GY IP 250 OP 250 PS 637: Performed by: FAMILY MEDICINE

## 2023-05-10 PROCEDURE — 76705 ECHO EXAM OF ABDOMEN: CPT | Mod: 26 | Performed by: FAMILY MEDICINE

## 2023-05-10 PROCEDURE — 99284 EMERGENCY DEPT VISIT MOD MDM: CPT | Mod: 25 | Performed by: FAMILY MEDICINE

## 2023-05-10 RX ORDER — OXYCODONE HYDROCHLORIDE 5 MG/1
5 TABLET ORAL EVERY 6 HOURS PRN
Qty: 10 TABLET | Refills: 0 | Status: SHIPPED | OUTPATIENT
Start: 2023-05-10

## 2023-05-10 RX ORDER — ACETAMINOPHEN 325 MG/1
650 TABLET ORAL ONCE
Status: COMPLETED | OUTPATIENT
Start: 2023-05-10 | End: 2023-05-10

## 2023-05-10 RX ORDER — OXYCODONE HYDROCHLORIDE 5 MG/1
10 TABLET ORAL ONCE
Status: COMPLETED | OUTPATIENT
Start: 2023-05-10 | End: 2023-05-10

## 2023-05-10 RX ORDER — KETOROLAC TROMETHAMINE 15 MG/ML
10 INJECTION, SOLUTION INTRAMUSCULAR; INTRAVENOUS ONCE
Status: COMPLETED | OUTPATIENT
Start: 2023-05-10 | End: 2023-05-10

## 2023-05-10 RX ORDER — LIDOCAINE 4 G/G
1 PATCH TOPICAL
Status: DISCONTINUED | OUTPATIENT
Start: 2023-05-10 | End: 2023-05-10 | Stop reason: HOSPADM

## 2023-05-10 RX ADMIN — KETOROLAC TROMETHAMINE 10 MG: 15 INJECTION, SOLUTION INTRAMUSCULAR; INTRAVENOUS at 13:31

## 2023-05-10 RX ADMIN — LIDOCAINE 1 PATCH: 560 PATCH PERCUTANEOUS; TOPICAL; TRANSDERMAL at 14:40

## 2023-05-10 RX ADMIN — ACETAMINOPHEN 650 MG: 325 TABLET ORAL at 13:30

## 2023-05-10 RX ADMIN — OXYCODONE HYDROCHLORIDE 10 MG: 5 TABLET ORAL at 13:31

## 2023-05-10 ASSESSMENT — ENCOUNTER SYMPTOMS
ABDOMINAL PAIN: 0
BACK PAIN: 1
HEADACHES: 0
COUGH: 0
CONSTIPATION: 0
DIAPHORESIS: 0
WHEEZING: 0
NAUSEA: 0
FEVER: 0
PALPITATIONS: 0
SINUS PRESSURE: 0
FREQUENCY: 0
CHILLS: 0
DYSURIA: 0
SORE THROAT: 0
SHORTNESS OF BREATH: 0
BLOOD IN STOOL: 0
DIARRHEA: 0
VOMITING: 0

## 2023-05-10 ASSESSMENT — ACTIVITIES OF DAILY LIVING (ADL)
ADLS_ACUITY_SCORE: 37
ADLS_ACUITY_SCORE: 37

## 2023-05-10 NOTE — ED NOTES
"Pt in right low back pain.  Non-radiating.  No numbness/tingling.  No bowel or bladder issues.  Hx of \"back problems since 2002.\"  "

## 2023-05-10 NOTE — DISCHARGE INSTRUCTIONS
ICD-10-CM    1. Lumbar back pain  M54.50     take ibuprofen 400 mg every 6 hours with food or milk. Take tylenol 650 mg orally every 6 hours. Use oxycodone annmarie breakthrough pain.   apply lidocaine patch on for 12 of every 24 hours.  recheck in clinic.  Although muscle relaxants (e.g. flexeril) may be used, they are not very effective and mostly cause sedation and falls.  I would avoid.  without trauma, no xray was done, but this may be needed on follow-up (for back pain over age 55 years),  return immed for abd pain, numbness inner thighs, dragging a foot, incontinence urine/stool, fever.  [facility] youtube online.       2. Abnormal urine  R82.90     no symptoms of this and urine is indeterminate.,  recommend witing for culture.

## 2023-05-10 NOTE — ED TRIAGE NOTES
"Pt here with lower back pain for past 3 days. 10/10. No relief with ice, heat, tylenol, ibuprofen at home. Pt states \"I just turned and it went out\".      Triage Assessment     Row Name 05/10/23 1128       Triage Assessment (Adult)    Airway WDL WDL       Respiratory WDL    Respiratory WDL WDL       Skin Circulation/Temperature WDL    Skin Circulation/Temperature WDL WDL       Cardiac WDL    Cardiac WDL WDL       Peripheral/Neurovascular WDL    Peripheral Neurovascular WDL WDL       Cognitive/Neuro/Behavioral WDL    Cognitive/Neuro/Behavioral WDL WDL              "

## 2023-05-10 NOTE — ED PROVIDER NOTES
History     Chief Complaint   Patient presents with     Back Pain     HPI  Prakash Desai is a 68 year old female who presents with a history of COPD, hypothyroidism, low back pain without prior spine surgery.  presents here without back trauma but starting 3 days ago she developed low back pain primarily on the left side.  There is no trauma.  Painful range of motion.  3 days of symptoms.  Exquisite pain in the left dorsi region.  Some radiation to the lateral right thigh.  No weakness in the lower extremities.  Denies cauda equina symptoms.    No symptoms suggestive of  cauda equina syndrome (central spinal stenosis) such as incontinence or retention of urine or stool, inner thigh numbness or foot drop.    She denies fever.  There is no dysuria urgency frequency hematuria.  No abdominal pain.  There are no radicular symptoms past the bilateral thighs.    No history of abdominal aortic aneurysm and last imaging study CT demonstrated no AAA from 2018    She tells me that she twisted her back intermittently and will have pain similar to today's evaluation    Allergies:  Allergies   Allergen Reactions     Amitriptyline Other (See Comments)     Shaking, couldn't walk or talk     Pcn [Penicillins] Rash       Problem List:    Patient Active Problem List    Diagnosis Date Noted     Chronic pain of both knees 05/09/2019     Priority: Medium     Hypothyroidism, unspecified type 05/16/2018     Priority: Medium     Panic attack 08/22/2016     Priority: Medium     Intractable headache 01/09/2016     Priority: Medium     Headache 12/13/2015     Priority: Medium     Primary thunderclap headache 12/12/2015     Priority: Medium     COPD (chronic obstructive pulmonary disease) (H) 11/20/2012     Priority: Medium     Melanocytic nevus 04/11/2012     Priority: Medium     (Problem list name updated by automated process. 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    Diamond Esquivel  March 20, 2012         Mild major depression (H) 11/16/2011     Priority: Medium     Health Care Home 10/16/2011     Priority: Medium     EMERGENCY CARE PLAN  Presenting Problem Signs and Symptoms Treatment Plan    Questions or conerns during clinic hours    I will call the clinic directly     Questions or conerns outside clinic hours    I will call the 24 hour nurse line at [phone]    Patient needs to schedule an appointment    I will call the 24 hour scheduling team at [phone] or clinic directly    Same day treatment     I will call the clinic first, nurse line if after hours, urgent care and express care if needed                            DX V65.8 REPLACED WITH [id number] HEALTH CARE HOME (04/08/2013)       HYPERLIPIDEMIA LDL GOAL <130 10/31/2010     Priority: Medium     GERD (gastroesophageal reflux disease) 06/24/2010     Priority: Medium     Plantar fasciitis 10/09/2009     Priority: Medium     Vitamin D deficiency 10/09/2009     Priority: Medium     Foot pain 05/22/2009     Priority: Medium     Intractable neuropathic pain secondary to bilateral plantar fasciitis 05/22/2009     Priority: Medium     Patient is followed by NAILA MALIK for ongoing prescription of narcotic pain medicine.  Med: Tylenol #3.   Maximum use per month: 15  Expected duration: Lifetime  Narcotic agreement on file: YES  Clinic visit recommended: 1 year       Osteoarthritis 12/18/2008     Priority: Medium        Past Medical History:    Past Medical History:   Diagnosis Date     Abnormal Pap smear of cervix ?     Unspecified hypothyroidism        Past Surgical History:    Past Surgical History:   Procedure " Laterality Date     HC REMOVE TONSILS/ADENOIDS,12+ Y/O      T & A 12+y.o.     LAPAROSCOPIC APPENDECTOMY  5/19/2013    Procedure: LAPAROSCOPIC APPENDECTOMY;;  Surgeon: Christiano Wall MD;  Location: [facility]     SURGICAL HISTORY OF -   2002    Eye Surgery-B/L varices     SURGICAL HISTORY OF -       four normal spontaneous vaginal deliveries     SURGICAL HISTORY OF -   8/1977    Proctoscopy     SURGICAL HISTORY OF -   12/1976    RML Episiotomy     SURGICAL HISTORY OF -   2/2002    Bilateral orbital fat decompression     ZZC LIGATE FALLOPIAN TUBE  8/1989    Laparoscopic tubal occlusion by cautery       Family History:    Family History   Problem Relation Age of Onset     Musculoskeletal Disorder Mother      Lipids Mother      Thyroid Disease Sister      Breast Cancer Sister 61        Double mastectomy.     Diabetes Son      Alcohol/Drug Son        Social History:  Marital Status:   [2]  Social History     Tobacco Use     Smoking status: Some Days     Packs/day: 0.40     Years: 35.00     Pack years: 14.00     Types: Cigarettes     Last attempt to quit: 11/15/2012     Years since quitting: 10.4     Smokeless tobacco: Never     Tobacco comments:     very little   Substance Use Topics     Alcohol use: Yes     Comment: rare     Drug use: Not Currently        Medications:    citalopram (CELEXA) 40 MG tablet  fluticasone (FLONASE) 50 MCG/ACT nasal spray  IBUPROFEN PO  levothyroxine (SYNTHROID/LEVOTHROID) 112 MCG tablet  meclizine (ANTIVERT) 25 MG tablet          Review of Systems   Constitutional: Negative for chills, diaphoresis and fever.   HENT: Negative for ear pain, sinus pressure and sore throat.    Eyes: Negative for visual disturbance.   Respiratory: Negative for cough, shortness of breath and wheezing.    Cardiovascular: Negative for chest pain and palpitations.   Gastrointestinal: Negative for abdominal pain, blood in stool, constipation, diarrhea, nausea and vomiting.   Genitourinary: Negative for dysuria,  "frequency and urgency.   Musculoskeletal: Positive for back pain.   Skin: Negative for rash.   Neurological: Negative for headaches.   All other systems reviewed and are negative.      Physical Exam   BP: 124/73  Pulse: 74  Temp: 98  F (36.7  C)  Resp: 18  Height: 167.6 cm (5' 6\")  Weight: 83.9 kg (185 lb)  SpO2: 97 %      Physical Exam  Constitutional:       General: She is in acute distress.      Appearance: She is not diaphoretic.   HENT:      Head: Atraumatic.   Eyes:      Conjunctiva/sclera: Conjunctivae normal.   Cardiovascular:      Rate and Rhythm: Normal rate and regular rhythm.      Heart sounds: No murmur heard.  Pulmonary:      Effort: No respiratory distress.      Breath sounds: No wheezing.   Abdominal:      General: Abdomen is flat. There is no distension.      Palpations: Abdomen is soft. There is no mass.      Tenderness: There is no abdominal tenderness. There is no right CVA tenderness, left CVA tenderness or guarding.   Musculoskeletal:      Cervical back: Neck supple.      Right lower leg: No edema.      Left lower leg: No edema.   Skin:     Coloration: Skin is not pale.      Findings: No rash.   Neurological:      General: No focal deficit present.      Mental Status: She is alert.      Motor: No weakness.          Back tenderness to palpation at the lat dorsi -but appears to be below the level of the flank.  No rash.  No midline tenderness to palpation.  Difficult exam due to exquisite pain -painful range of motion consistent with low back pain.      Distal motor exam fully intact.  Right leg raise relatively negative.  Normal distal motor function normal distal color.  Capillary refill normal distally.    ED Course                 Procedures              Critical Care time:  none               Results for orders placed or performed during the hospital encounter of 05/10/23 (from the past 24 hour(s))   POC US ABDOMEN LIMITED    Impression    [facility] Procedure Note    Limited Bedside " ED Renal Ultrasound:    PERFORMED BY: Dr. Aaron Gleason MD  INDICATIONS:  Flank Pain  PROBE: Low frequency convex probe  BODY LOCATION:  Abdomen  FINDINGS:  The ultrasound was performed with longitudinal and transverse views.   Right Kidney:   Hydronephrosis:  None   Renal cyst:  None  Left Kidney:   Hydronephrosis:  None   Renal cyst:  None  INTERPRETATION:  The evaluation of the kidneys was normal without evidence of hydronephrosis or cysts.  IMAGE DOCUMENTATION: Images were archived to PACs system.    Bladder not distended   UA reflex to Microscopic   Result Value Ref Range    Color Urine Yellow Colorless, Straw, Light Yellow, Yellow    Appearance Urine Slightly Cloudy (A) Clear    Glucose Urine Negative Negative mg/dL    Bilirubin Urine Negative Negative    Ketones Urine Negative Negative mg/dL    Specific Gravity Urine 1.021 1.003 - 1.035    Blood Urine Negative Negative    pH Urine 5.0 5.0 - 7.0    Protein Albumin Urine Negative Negative mg/dL    Urobilinogen Urine Normal Normal, 2.0 mg/dL    Nitrite Urine Negative Negative    Leukocyte Esterase Urine Large (A) Negative    RBC Urine 1 <=2 /HPF    WBC Urine 15 (H) <=5 /HPF    Squamous Epithelials Urine 2 (H) <=1 /HPF    Mucus Urine Present (A) None Seen /LPF       Medications   oxyCODONE (ROXICODONE) tablet 10 mg (has no administration in time range)   acetaminophen (TYLENOL) tablet 650 mg (has no administration in time range)   ketorolac (TORADOL) injection 10 mg (has no administration in time range)       Assessments & Plan (with Medical Decision Making)     MDM: Prakash Desai is a 68 year old female presents with low back pain, what appears to be musculoskeletal pain.  No urinary tract symptoms and no red flag findings suggestive of cauda equina.  Reassuring neurologic exam.  Bedside ultrasound without hydronephrosis.  We will check urinalysis and plan for discharge home on oral analgesics.  Follow-up with primary provider.  No indication for  corticosteroids.  We will have her use ibuprofen Tylenol and oxycodone for breakthrough pain.  Lidocaine patch.      There is no hydronephrosis on her ultrasound and her urine has a mixed picture that I will send for culture but she is not symptomatic of any urinary tract infection at this time and could just be chronic colonization and bacteriuria.    I do suspect given the tenderness to palpation in the right low back over LAT dorsi that at times was exquisite that this is musculoskeletal.  I did however spend time discussing the differential and precautions for return related to findings as below.  She has a benign abdominal exam no abdominal pain in her flank itself does not appear to be affected and she is afebrile with reassuring vital signs.    I did recommend close interval follow-up with her primary providers.  additional  recommendations as below.    I have reviewed the nursing notes.    I have reviewed the findings, diagnosis, plan and need for follow up with the patient.           Medical Decision Making  The patient's presentation was of moderate complexity (an acute complicated injury).    The patient's evaluation involved:  review of external note(s) from 2 sources (see separate area of note for details)    The patient's management necessitated moderate risk (prescription drug management including medications given in the ED).        New Prescriptions    No medications on file       Final diagnoses:   Lumbar back pain - take ibuprofen 400 mg every 6 hours with food or milk. Take tylenol 650 mg orally every 6 hours. Use oxycodone annmarie breakthrough pain.   apply lidocaine patch on for 12 of every 24 hours.  recheck in clinic.  Although muscle relaxants (e.g. flexeril) may be used, they are not very effective and mostly cause sedation and falls.  I would avoid.  without trauma, no xray was done, but this may be needed on follow-up (for back pain over age 55 years),  return immed for abd pain, numbness  inner thighs, dragging a foot, incontinence urine/stool, fever       5/10/2023   [facility] EMERGENCY DEPT     Aaron Gleason MD  05/10/23 5000

## 2023-05-12 LAB — BACTERIA UR CULT: NORMAL

## 2023-05-12 NOTE — RESULT ENCOUNTER NOTE
Final urine culture report is negative.  Adult Negative Urine culture parameters per protocol: Any # Urogenital single or mixed organism, <10,000 col/ml single organism (cath/midstream), and > 3 organisms (No susceptibilities performed).  [facility] Emergency Dept discharge antibiotic prescribed (If applicable): None  Treatment recommendations per [facility] ED Lab Result Urine Culture protocol.

## 2023-09-03 ENCOUNTER — HEALTH MAINTENANCE LETTER (OUTPATIENT)
Age: 68
End: 2023-09-03

## 2024-06-09 ENCOUNTER — HEALTH MAINTENANCE LETTER (OUTPATIENT)
Age: 69
End: 2024-06-09

## 2025-06-15 ENCOUNTER — HEALTH MAINTENANCE LETTER (OUTPATIENT)
Age: 70
End: 2025-06-15